# Patient Record
Sex: FEMALE | Race: WHITE
[De-identification: names, ages, dates, MRNs, and addresses within clinical notes are randomized per-mention and may not be internally consistent; named-entity substitution may affect disease eponyms.]

---

## 2020-01-22 ENCOUNTER — HOSPITAL ENCOUNTER (EMERGENCY)
Dept: HOSPITAL 8 - ED | Age: 75
Discharge: HOME | End: 2020-01-22
Payer: MEDICARE

## 2020-01-22 VITALS — BODY MASS INDEX: 29.86 KG/M2 | HEIGHT: 60 IN | WEIGHT: 152.12 LBS

## 2020-01-22 VITALS — DIASTOLIC BLOOD PRESSURE: 74 MMHG | SYSTOLIC BLOOD PRESSURE: 104 MMHG

## 2020-01-22 DIAGNOSIS — I10: ICD-10-CM

## 2020-01-22 DIAGNOSIS — Z00.00: Primary | ICD-10-CM

## 2020-01-22 DIAGNOSIS — Z91.14: ICD-10-CM

## 2020-01-22 PROCEDURE — 99283 EMERGENCY DEPT VISIT LOW MDM: CPT

## 2020-01-22 NOTE — NUR
ADDENDUM:  WHILST SPEAKING TO STAFF ON THE PHONE THIS RN REQUESTED THE SENDING 
PHYSICIAN COME TO THE HOSPITAL AND PLACE THE ORDERS HERSELF IF SHE REQUESTS 
THEY BE DONE WITHOUT HAVING ANY DOCUMENTATION SPECIFYING WHAT SHE WANTS.  THIS 
WAS DECLINED BY STAFF.  THIS RN ALSO ASKED WHY THIS TESTING COULDNT BE DONE AS 
AN OUTPATIENT AT AN OUTPATIENT LAB.  STAFF REPLIED STATING THEY ARE JUST 
FOLLOWING THE DOCTORS ORDERS.

## 2020-01-22 NOTE — NUR
PT AOX4.  DENIES COMPLAINTS.  DOES NOT WANT ANY TESTING DONE.  CALLED THE 
[facility] TO INQUIRE ABOUT DECISION TO SEND PT TO THE HOSPITAL.  SPOKE TO 
SONJA WHO TRANSFERRED TO ME TO [facility] MEJIA WHOM INFORMED ME THEY WERE 
ONLY FOLLOWING CORRINNE JEWELLS NP ORDER'S.  i ASKED ABOUT WHAT THEY BELIEVE IS 
WRONG AND THEY STATE THE PT IS REFUSING TO TAKE HER MEDICATION.  PT STATES THE 
STAFF THERE HASNT BEEN GIVING HER HER MEDICATIONS ON TIME SO SHE MISSED A 
COUPLE DOSES.  ABX BOTTLE WAS CHECKED AND THREE PILLS REMAIN ON A 7 DAY COURSE 
BID FILLED ONT HE 14TH.  STAFF WAS INFORMED OF THIS. THIS RN REQUESTED TO SPEAK 
TO CORINNE JEWELL NP WHICH THE STAFF REFUSED TO CALL AND REFUSED TO ALLOW THIS 
RN TO CALL.  THEY ALSO STATE THERE IS NO CURRENT MD OR NP AT THEIR FACILITY 
CURRENTLY.  MEJIA THEN CALLED NURSE KYE(?) ON ANOTHER PHONE AND VIA 
SPEAKER ON THE OTHER PHONE WE CONVERSED.  SHE ALSO DECLINED TO GIVE A 
LEGITIMATE REASON FOR THE TRANSPORT OTHER THAN MEDICATION NON COMPLIANCE.  I 
ASKED HER IF THIS RN COULD SPEAK TO THEIR PROVIDER AND SHE ALSO DECLINED.  THIS 
RN SPOKE TO THE PT ABOUT THIS AND SHE STATES SHE IS WILLING TO GO BACK AND BE 
COMPLIANT WITH HER ABX.

## 2020-01-22 NOTE — NUR
BIBA. REPORT RECEIVED FROM EMS. PT IS MORE CONFUSED AND RISK FOR FALL PER 
NURSING HOME STAFF. RECENT UTI AND ABX NOT COMPLETED(3DAYS LEFT?). PT DENIES 
ANY PHYSICAL SYMPTOMS. PT'S AOX4. RESPS EVEN AND UNLABORED. ALL MONITORS IN 
PLACE. CALL LIGHT WITHIN REACH. NSR RATE 90'S ON CARDIAC MONITOR AT THIS TIME.

## 2020-01-22 NOTE — NUR
WITH PTS PERMISSION THIS RN SPOKE WITH PTS DAUGHTER WHO IS ALSO PTS POA.  PT 
SIGNED RELEASE OF INFORMATION. POC DISCUSSED AND ALL ARE IN AGREEMENT.  PT TO 
BE DISCHARGED.

## 2020-01-22 NOTE — NUR
THIS RN AND ER MD CONVERSED ABOUT PTS CARE.  WE ARE IN AGREEMENT PT DOES NOT 
SHOW ANY SIGNS OF SEPSIS.  PT REMAINS AOX4.  VITALS SIGNS REMAIN STABLE.  THIS 
RN, ER MD, AND PT ARE IN AGREEMENT THAT ANY TESTING AT THIS TIME IS UNNECESSARY 
AND UNWARRANTED.

## 2020-11-07 ENCOUNTER — APPOINTMENT (OUTPATIENT)
Dept: RADIOLOGY | Facility: MEDICAL CENTER | Age: 75
End: 2020-11-07
Attending: EMERGENCY MEDICINE
Payer: MEDICARE

## 2020-11-07 ENCOUNTER — HOSPITAL ENCOUNTER (OUTPATIENT)
Facility: MEDICAL CENTER | Age: 75
End: 2020-11-17
Attending: EMERGENCY MEDICINE | Admitting: HOSPITALIST
Payer: MEDICARE
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
## 2020-11-07 DIAGNOSIS — S43.014A ANTERIOR DISLOCATION OF RIGHT SHOULDER, INITIAL ENCOUNTER: ICD-10-CM

## 2020-11-07 DIAGNOSIS — S43.014A ANTERIOR DISLOCATION OF SHOULDER, RIGHT, INITIAL ENCOUNTER: ICD-10-CM

## 2020-11-07 LAB
ALBUMIN SERPL BCP-MCNC: 4.4 G/DL (ref 3.2–4.9)
ALBUMIN/GLOB SERPL: 1.6 G/DL
ALP SERPL-CCNC: 136 U/L (ref 30–99)
ALT SERPL-CCNC: 18 U/L (ref 2–50)
ANION GAP SERPL CALC-SCNC: 13 MMOL/L (ref 7–16)
AST SERPL-CCNC: 30 U/L (ref 12–45)
BASOPHILS # BLD AUTO: 0.4 % (ref 0–1.8)
BASOPHILS # BLD: 0.06 K/UL (ref 0–0.12)
BILIRUB SERPL-MCNC: 0.4 MG/DL (ref 0.1–1.5)
BUN SERPL-MCNC: 15 MG/DL (ref 8–22)
CALCIUM SERPL-MCNC: 9.3 MG/DL (ref 8.5–10.5)
CHLORIDE SERPL-SCNC: 95 MMOL/L (ref 96–112)
CO2 SERPL-SCNC: 23 MMOL/L (ref 20–33)
COVID ORDER STATUS COVID19: NORMAL
CREAT SERPL-MCNC: 0.65 MG/DL (ref 0.5–1.4)
EOSINOPHIL # BLD AUTO: 0.02 K/UL (ref 0–0.51)
EOSINOPHIL NFR BLD: 0.1 % (ref 0–6.9)
ERYTHROCYTE [DISTWIDTH] IN BLOOD BY AUTOMATED COUNT: 46.5 FL (ref 35.9–50)
GLOBULIN SER CALC-MCNC: 2.7 G/DL (ref 1.9–3.5)
GLUCOSE SERPL-MCNC: 122 MG/DL (ref 65–99)
HCT VFR BLD AUTO: 38.7 % (ref 37–47)
HGB BLD-MCNC: 13.2 G/DL (ref 12–16)
IMM GRANULOCYTES # BLD AUTO: 0.08 K/UL (ref 0–0.11)
IMM GRANULOCYTES NFR BLD AUTO: 0.5 % (ref 0–0.9)
LYMPHOCYTES # BLD AUTO: 2.1 K/UL (ref 1–4.8)
LYMPHOCYTES NFR BLD: 13.2 % (ref 22–41)
MCH RBC QN AUTO: 32.5 PG (ref 27–33)
MCHC RBC AUTO-ENTMCNC: 34.1 G/DL (ref 33.6–35)
MCV RBC AUTO: 95.3 FL (ref 81.4–97.8)
MONOCYTES # BLD AUTO: 0.88 K/UL (ref 0–0.85)
MONOCYTES NFR BLD AUTO: 5.5 % (ref 0–13.4)
NEUTROPHILS # BLD AUTO: 12.76 K/UL (ref 2–7.15)
NEUTROPHILS NFR BLD: 80.3 % (ref 44–72)
NRBC # BLD AUTO: 0 K/UL
NRBC BLD-RTO: 0 /100 WBC
PLATELET # BLD AUTO: 370 K/UL (ref 164–446)
PMV BLD AUTO: 9.7 FL (ref 9–12.9)
POTASSIUM SERPL-SCNC: 3.9 MMOL/L (ref 3.6–5.5)
PROT SERPL-MCNC: 7.1 G/DL (ref 6–8.2)
RBC # BLD AUTO: 4.06 M/UL (ref 4.2–5.4)
SODIUM SERPL-SCNC: 131 MMOL/L (ref 135–145)
WBC # BLD AUTO: 15.9 K/UL (ref 4.8–10.8)

## 2020-11-07 PROCEDURE — 73060 X-RAY EXAM OF HUMERUS: CPT | Mod: RT

## 2020-11-07 PROCEDURE — G0378 HOSPITAL OBSERVATION PER HR: HCPCS

## 2020-11-07 PROCEDURE — 23650 CLTX SHO DSLC W/MNPJ WO ANES: CPT | Mod: XU

## 2020-11-07 PROCEDURE — 99285 EMERGENCY DEPT VISIT HI MDM: CPT

## 2020-11-07 PROCEDURE — 700111 HCHG RX REV CODE 636 W/ 250 OVERRIDE (IP): Performed by: EMERGENCY MEDICINE

## 2020-11-07 PROCEDURE — 80053 COMPREHEN METABOLIC PANEL: CPT

## 2020-11-07 PROCEDURE — 73030 X-RAY EXAM OF SHOULDER: CPT | Mod: RT

## 2020-11-07 PROCEDURE — U0003 INFECTIOUS AGENT DETECTION BY NUCLEIC ACID (DNA OR RNA); SEVERE ACUTE RESPIRATORY SYNDROME CORONAVIRUS 2 (SARS-COV-2) (CORONAVIRUS DISEASE [COVID-19]), AMPLIFIED PROBE TECHNIQUE, MAKING USE OF HIGH THROUGHPUT TECHNOLOGIES AS DESCRIBED BY CMS-2020-01-R: HCPCS

## 2020-11-07 PROCEDURE — 85025 COMPLETE CBC W/AUTO DIFF WBC: CPT

## 2020-11-07 PROCEDURE — 94770 HCHG CO2 EXPIRED GAS DETERMINATION: CPT

## 2020-11-07 PROCEDURE — 96374 THER/PROPH/DIAG INJ IV PUSH: CPT | Mod: XU

## 2020-11-07 PROCEDURE — 99219 PR INITIAL OBSERVATION CARE,LEVL II: CPT | Performed by: HOSPITALIST

## 2020-11-07 PROCEDURE — C9803 HOPD COVID-19 SPEC COLLECT: HCPCS | Performed by: INTERNAL MEDICINE

## 2020-11-07 RX ORDER — ONDANSETRON 2 MG/ML
4 INJECTION INTRAMUSCULAR; INTRAVENOUS EVERY 4 HOURS PRN
Status: DISCONTINUED | OUTPATIENT
Start: 2020-11-07 | End: 2020-11-17 | Stop reason: HOSPADM

## 2020-11-07 RX ORDER — BISACODYL 10 MG
10 SUPPOSITORY, RECTAL RECTAL
Status: DISCONTINUED | OUTPATIENT
Start: 2020-11-07 | End: 2020-11-17 | Stop reason: HOSPADM

## 2020-11-07 RX ORDER — BUPROPION HYDROCHLORIDE 100 MG/1
100 TABLET, EXTENDED RELEASE ORAL DAILY
COMMUNITY

## 2020-11-07 RX ORDER — MORPHINE SULFATE 4 MG/ML
2 INJECTION, SOLUTION INTRAMUSCULAR; INTRAVENOUS
Status: DISCONTINUED | OUTPATIENT
Start: 2020-11-07 | End: 2020-11-17 | Stop reason: HOSPADM

## 2020-11-07 RX ORDER — ONDANSETRON 4 MG/1
4 TABLET, ORALLY DISINTEGRATING ORAL EVERY 4 HOURS PRN
Status: DISCONTINUED | OUTPATIENT
Start: 2020-11-07 | End: 2020-11-17 | Stop reason: HOSPADM

## 2020-11-07 RX ORDER — DULOXETIN HYDROCHLORIDE 30 MG/1
60 CAPSULE, DELAYED RELEASE ORAL DAILY
Status: DISCONTINUED | OUTPATIENT
Start: 2020-11-08 | End: 2020-11-17 | Stop reason: HOSPADM

## 2020-11-07 RX ORDER — LABETALOL HYDROCHLORIDE 5 MG/ML
10 INJECTION, SOLUTION INTRAVENOUS EVERY 4 HOURS PRN
Status: DISCONTINUED | OUTPATIENT
Start: 2020-11-07 | End: 2020-11-17 | Stop reason: HOSPADM

## 2020-11-07 RX ORDER — ACETAMINOPHEN 325 MG/1
650 TABLET ORAL EVERY 6 HOURS PRN
Status: DISCONTINUED | OUTPATIENT
Start: 2020-11-07 | End: 2020-11-17 | Stop reason: HOSPADM

## 2020-11-07 RX ORDER — FLUOXETINE HYDROCHLORIDE 20 MG/1
20 CAPSULE ORAL EVERY MORNING
Status: DISCONTINUED | OUTPATIENT
Start: 2020-11-08 | End: 2020-11-07

## 2020-11-07 RX ORDER — POLYETHYLENE GLYCOL 3350 17 G/17G
1 POWDER, FOR SOLUTION ORAL
Status: DISCONTINUED | OUTPATIENT
Start: 2020-11-07 | End: 2020-11-17 | Stop reason: HOSPADM

## 2020-11-07 RX ORDER — BUPROPION HYDROCHLORIDE 100 MG/1
100 TABLET, EXTENDED RELEASE ORAL DAILY
Status: DISCONTINUED | OUTPATIENT
Start: 2020-11-08 | End: 2020-11-17 | Stop reason: HOSPADM

## 2020-11-07 RX ORDER — DULOXETIN HYDROCHLORIDE 60 MG/1
60 CAPSULE, DELAYED RELEASE ORAL DAILY
COMMUNITY

## 2020-11-07 RX ORDER — OXYCODONE HYDROCHLORIDE 5 MG/1
2.5 TABLET ORAL
Status: DISCONTINUED | OUTPATIENT
Start: 2020-11-07 | End: 2020-11-17 | Stop reason: HOSPADM

## 2020-11-07 RX ORDER — MULTIVIT WITH MINERALS/LUTEIN
1 TABLET ORAL DAILY
COMMUNITY

## 2020-11-07 RX ORDER — ENALAPRIL MALEATE 20 MG/1
20 TABLET ORAL EVERY MORNING
COMMUNITY

## 2020-11-07 RX ORDER — OXYCODONE HYDROCHLORIDE 5 MG/1
5 TABLET ORAL
Status: DISCONTINUED | OUTPATIENT
Start: 2020-11-07 | End: 2020-11-17 | Stop reason: HOSPADM

## 2020-11-07 RX ORDER — OXYBUTYNIN CHLORIDE 5 MG/1
5 TABLET ORAL
COMMUNITY

## 2020-11-07 RX ORDER — ENALAPRIL MALEATE 10 MG/1
20 TABLET ORAL EVERY MORNING
Status: DISCONTINUED | OUTPATIENT
Start: 2020-11-08 | End: 2020-11-17 | Stop reason: HOSPADM

## 2020-11-07 RX ORDER — AMOXICILLIN 250 MG
2 CAPSULE ORAL 2 TIMES DAILY
Status: DISCONTINUED | OUTPATIENT
Start: 2020-11-07 | End: 2020-11-17 | Stop reason: HOSPADM

## 2020-11-07 RX ORDER — FLUOXETINE HYDROCHLORIDE 20 MG/1
20 CAPSULE ORAL EVERY MORNING
COMMUNITY

## 2020-11-07 RX ADMIN — PROPOFOL 60 MG: 10 INJECTION, EMULSION INTRAVENOUS at 14:38

## 2020-11-07 ASSESSMENT — ENCOUNTER SYMPTOMS
ABDOMINAL PAIN: 0
NAUSEA: 0
LOSS OF CONSCIOUSNESS: 0
FEVER: 0
HEADACHES: 0
WEIGHT LOSS: 0
NERVOUS/ANXIOUS: 0
DIZZINESS: 0
SHORTNESS OF BREATH: 0
SORE THROAT: 0

## 2020-11-07 ASSESSMENT — LIFESTYLE VARIABLES
EVER HAD A DRINK FIRST THING IN THE MORNING TO STEADY YOUR NERVES TO GET RID OF A HANGOVER: NO
TOTAL SCORE: 0
DO YOU DRINK ALCOHOL: NO
ON A TYPICAL DAY WHEN YOU DRINK ALCOHOL HOW MANY DRINKS DO YOU HAVE: 0
EVER FELT BAD OR GUILTY ABOUT YOUR DRINKING: NO
TOTAL SCORE: 0
HAVE PEOPLE ANNOYED YOU BY CRITICIZING YOUR DRINKING: NO
HOW MANY TIMES IN THE PAST YEAR HAVE YOU HAD 5 OR MORE DRINKS IN A DAY: 0
AVERAGE NUMBER OF DAYS PER WEEK YOU HAVE A DRINK CONTAINING ALCOHOL: 0
CONSUMPTION TOTAL: NEGATIVE
TOTAL SCORE: 0
HAVE YOU EVER FELT YOU SHOULD CUT DOWN ON YOUR DRINKING: NO

## 2020-11-07 ASSESSMENT — PATIENT HEALTH QUESTIONNAIRE - PHQ9
SUM OF ALL RESPONSES TO PHQ9 QUESTIONS 1 AND 2: 0
2. FEELING DOWN, DEPRESSED, IRRITABLE, OR HOPELESS: NOT AT ALL
1. LITTLE INTEREST OR PLEASURE IN DOING THINGS: NOT AT ALL

## 2020-11-07 ASSESSMENT — FIBROSIS 4 INDEX: FIB4 SCORE: 1.47

## 2020-11-07 NOTE — ED TRIAGE NOTES
.Hannah Singh  .  Chief Complaint   Patient presents with   • Shoulder Pain   • GLF     Patient FREDRICK FLORES from [place] c/o right shoulder pain s/p fall. + CMS. aao x 4.     ERP to see.     Report given to YOHAN Malcolm.

## 2020-11-07 NOTE — ED PROVIDER NOTES
ED Provider Note    CHIEF COMPLAINT  Chief Complaint   Patient presents with   • Shoulder Pain   • GLF       HPI  Hannah Singh is a 75 y.o. female who presents for evaluation of a right shoulder injury after a ground-level fall.  She resides at [place], states that she tripped and fell and only injured her right shoulder.  Denies a headache or neck pain or back pain, no chest pain or abdominal pain.  She has no current weakness or numbness but states that when this first happened she had tingling in her right hand now resolved.  History of hypertension.  Denies blood thinners.  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    REVIEW OF SYSTEMS  Negative for headache, neck pain, chest pain, back pain, abdominal pain, vomiting, headache, all other systems are negative.    PAST MEDICAL HISTORY  Past Medical History:   Diagnosis Date   • Hypertension        FAMILY HISTORY  History reviewed. No pertinent family history.    SOCIAL HISTORY  Social History     Tobacco Use   • Smoking status: Never Smoker   • Smokeless tobacco: Never Used   Substance Use Topics   • Alcohol use: Not Currently   • Drug use: Never       SURGICAL HISTORY  History reviewed. No pertinent surgical history.    CURRENT MEDICATIONS  I personally reviewed the medication list in the charting documentation.     ALLERGIES  No Known Allergies    MEDICAL RECORD  I have reviewed patient's medical record and pertinent results are listed above.      PHYSICAL EXAM  VITAL SIGNS: BP (!) 182/90   Pulse 85   Temp 36.1 °C (97 °F) (Temporal)   Resp 16   Wt 72.6 kg (160 lb)   SpO2 95%    Constitutional: Elderly, frail appearing, laying on her left side holding her right arm still.    HENT: Normocephalic, no evidence of acute trauma.  Eyes: No scleral icterus. Normal conjunctiva   Neck: No tenderness  Thorax & Lungs: Normal  nonlabored respirations  Abdomen: Soft, with no tenderness  Skin: Warm, dry. No rash appreciated  Extremities/Musculoskeletal: Difficult examination given her position and lack of cooperation.  Normal radial pulse and sensation involving the right arm distally.  Hard to assess for deformity of the right shoulder.  Neurologic: Alert & oriented. No focal deficits observed.     DIAGNOSTIC STUDIES / PROCEDURES    RADIOLOGY  DX-SHOULDER 2+ RIGHT   Final Result         Interval reduction of the right shoulder dislocation. No definite fracture is seen.      DX-HUMERUS 2+ RIGHT   Final Result            Anterior inferior right shoulder dislocation.      DX-SHOULDER 2+ RIGHT   Final Result         Anterior inferior right shoulder dislocation.            Joint Reduction Procedure Note    Indication: Right shoulder dislocation    Consent: The patient was counseled regarding the procedure, it's indications, risks, potential complications and alternatives and any questions were answered. Consent was obtained.    Procedure: The pre-reduction exam showed distal perfusion & neurologic function to be normal. The patient was placed in the appropriate position. Anesthesia/pain control was offered but the patient refused. Reduction of the right shoulder was performed by traction and counter traction. Post reduction films were obtained and revealed satisfactory reduction. A post-reduction exam revealed distal perfusion & neurologic function to be normal. The affected area was immobilized with a shoulder immobilizer.    The patient tolerated the procedure well.    Complications: None          Conscious Sedation Procedure Note    Indication: shoulder dislocation    Consent: I have discussed with the patient and/or the patient representative the indication, alternatives, and the possible risks and/or complications of the planned procedure and the anesthesia methods. The patient and/or patient representative appear to understand and agree  to proceed.    Physician Involvement: The attending physician was present and supervising this procedure.    Pre-Sedation Documentation and Exam: I have personally completed a history, physical exam & review of systems for this patient (see notes).  Airway Assessment: Mallampati Class II - (soft palate, fauces & uvula are visible)  f3  Prior History of Anesthesia Complications: none    ASA Classification: Class 3 - A patient with severe systemic disease that limits activity but is not incapacitating    Sedation/ Anesthesia Plan: intravenous sedation    Medications Used: propofol intravenously    Monitoring and Safety: The patient was placed on a cardiac monitor and vital signs, pulse oximetry and level of consciousness were continuously evaluated throughout the procedure. The patient was closely monitored until recovery from the medications was complete and the patient had returned to baseline status. Respiratory therapy was on standby at all times during the procedure.      (The following sections must be completed)  Post-Sedation Vital Signs: Vital signs were reviewed and were stable after the procedure (see flow sheet for vitals)            Intraservice Time: Greater than 10 minutes    Post-Sedation Exam: Lungs: clear and Cardiovascular: normal           Complications: none    I provided both the sedation and procedure, a nurse was present at the bedside for the entire procedure.       COURSE & MEDICAL DECISION MAKING  I have reviewed any medical record information, laboratory studies and radiographic results as noted above.    Encounter Summary: This is a 75 y.o. female with right shoulder injury after ground-level fall, denies any other injuries, has no other complaints, no other traumatic findings on exam.  This history and physical is very limited because she is not very cooperative, she only wants an x-ray and no other treatment or evaluation.  At this point an x-ray will be obtained, differential includes  shoulder dislocation versus humerus fracture ------- x-ray reveals a shoulder dislocation.  She was sedated and this was successfully reduced here in the emergency department.  She was placed in a shoulder immobilizer.  Unfortunately, the patient does not feel comfortable going home as she lives in an independent living facility and uses a walker, she states she is not safe to use a cane, case management has been consulted, they evaluate the patient, spoke to family members and unfortunately have not been able to find a safe solution so they recommend admission the hospital for ultimate placement elsewhere.      DISPOSITION: Admit in guarded condition      FINAL IMPRESSION  1. Anterior dislocation of right shoulder, initial encounter    2. Fall from ground level           This dictation was created using voice recognition software. The accuracy of the dictation is limited to the abilities of the software. I expect there may be some errors of grammar and possibly content. The nursing notes were reviewed and certain aspects of this information were incorporated into this note.    Electronically signed by: Rashi Patterson M.D., 11/7/2020 12:51 PM

## 2020-11-07 NOTE — RESPIRATORY CARE
Conscious Sedation Respiratory Update               Events/Summary/Plan: present for conscious sedation. no complications (11/07/20 4960)

## 2020-11-07 NOTE — ED NOTES
Pt to be mildly sedated for R shoulder reduction, RT, Dr. Patterson, and this RN at bedside, VSS on RA, timeout performed, pt giving verbal consent.

## 2020-11-07 NOTE — ED NOTES
Pt resting in bed, agitated demeanor, refusing IV and pain meds, VSS on RA, RUE in EMS sling, will continue to monitor.

## 2020-11-08 PROBLEM — D72.829 LEUKOCYTOSIS: Status: ACTIVE | Noted: 2020-11-08

## 2020-11-08 PROBLEM — E87.1 HYPONATREMIA: Status: ACTIVE | Noted: 2020-11-08

## 2020-11-08 LAB
SARS-COV-2 RNA RESP QL NAA+PROBE: NOTDETECTED
SPECIMEN SOURCE: NORMAL

## 2020-11-08 PROCEDURE — 700111 HCHG RX REV CODE 636 W/ 250 OVERRIDE (IP): Performed by: HOSPITALIST

## 2020-11-08 PROCEDURE — 97162 PT EVAL MOD COMPLEX 30 MIN: CPT

## 2020-11-08 PROCEDURE — 96372 THER/PROPH/DIAG INJ SC/IM: CPT

## 2020-11-08 PROCEDURE — 99225 PR SUBSEQUENT OBSERVATION CARE,LEVEL II: CPT | Performed by: INTERNAL MEDICINE

## 2020-11-08 PROCEDURE — 700102 HCHG RX REV CODE 250 W/ 637 OVERRIDE(OP): Performed by: HOSPITALIST

## 2020-11-08 PROCEDURE — A9270 NON-COVERED ITEM OR SERVICE: HCPCS | Performed by: HOSPITALIST

## 2020-11-08 PROCEDURE — G0378 HOSPITAL OBSERVATION PER HR: HCPCS

## 2020-11-08 RX ADMIN — BUPROPION HYDROCHLORIDE 100 MG: 100 TABLET, FILM COATED, EXTENDED RELEASE ORAL at 05:33

## 2020-11-08 RX ADMIN — ENOXAPARIN SODIUM 40 MG: 40 INJECTION SUBCUTANEOUS at 05:30

## 2020-11-08 RX ADMIN — DOCUSATE SODIUM 50 MG AND SENNOSIDES 8.6 MG 2 TABLET: 8.6; 5 TABLET, FILM COATED ORAL at 05:29

## 2020-11-08 RX ADMIN — ENALAPRIL MALEATE 20 MG: 10 TABLET ORAL at 05:33

## 2020-11-08 RX ADMIN — DULOXETINE HYDROCHLORIDE 60 MG: 30 CAPSULE, DELAYED RELEASE ORAL at 05:29

## 2020-11-08 ASSESSMENT — COGNITIVE AND FUNCTIONAL STATUS - GENERAL
MOVING FROM LYING ON BACK TO SITTING ON SIDE OF FLAT BED: UNABLE
MOVING TO AND FROM BED TO CHAIR: UNABLE
CLIMB 3 TO 5 STEPS WITH RAILING: A LOT
STANDING UP FROM CHAIR USING ARMS: A LOT
CLIMB 3 TO 5 STEPS WITH RAILING: TOTAL
SUGGESTED CMS G CODE MODIFIER MOBILITY: CM
DRESSING REGULAR LOWER BODY CLOTHING: A LITTLE
MOVING TO AND FROM BED TO CHAIR: A LITTLE
HELP NEEDED FOR BATHING: A LITTLE
WALKING IN HOSPITAL ROOM: TOTAL
DAILY ACTIVITIY SCORE: 19
MOBILITY SCORE: 17
PERSONAL GROOMING: A LITTLE
MOBILITY SCORE: 9
SUGGESTED CMS G CODE MODIFIER DAILY ACTIVITY: CK
TURNING FROM BACK TO SIDE WHILE IN FLAT BAD: A LITTLE
TURNING FROM BACK TO SIDE WHILE IN FLAT BAD: A LITTLE
SUGGESTED CMS G CODE MODIFIER MOBILITY: CK
TOILETING: A LITTLE
DRESSING REGULAR UPPER BODY CLOTHING: A LITTLE
MOVING FROM LYING ON BACK TO SITTING ON SIDE OF FLAT BED: A LITTLE
WALKING IN HOSPITAL ROOM: A LITTLE
STANDING UP FROM CHAIR USING ARMS: A LITTLE

## 2020-11-08 ASSESSMENT — ENCOUNTER SYMPTOMS
FOCAL WEAKNESS: 0
MYALGIAS: 1
FEVER: 0
DIZZINESS: 0
BACK PAIN: 0
SHORTNESS OF BREATH: 0
MEMORY LOSS: 0
CHILLS: 0
HEADACHES: 0
COUGH: 0
VOMITING: 0
PALPITATIONS: 0
SENSORY CHANGE: 0
SPEECH CHANGE: 0
FLANK PAIN: 0
DEPRESSION: 0
ABDOMINAL PAIN: 0
NAUSEA: 0
NERVOUS/ANXIOUS: 0
DIAPHORESIS: 0
WEAKNESS: 1
FALLS: 1

## 2020-11-08 ASSESSMENT — GAIT ASSESSMENTS: GAIT LEVEL OF ASSIST: UNABLE TO PARTICIPATE

## 2020-11-08 NOTE — PROGRESS NOTES
Bedside report received.  Assessment complete.  A&O x 4. Patient calls appropriately.  Patient ambulates with x1-2 assist.    Patient has 0/10 pain. Pain managed with prescribed medications.  Denies N&V. Tolerating regular diet.  Right upper extremity in immobilizer sling.   + void, + flatus, - BM.  Patient denies SOB.  SCD's off.    Review plan with of care with patient. Call light and personal belongings with in reach. Hourly rounding in place. All needs met at this time.

## 2020-11-08 NOTE — PROGRESS NOTES
[facility] Medicine Daily Progress Note    Date of Service  11/8/2020    Chief Complaint  75 y.o. female admitted 11/7/2020 with shoulder dislocation.    Hospital Course    This is a 75 y.o. female history of hypertension, bladder incontinence who presented 11/7/2020 with a fall today while bending over to try and clean up a spilled soda and ended up falling onto her right shoulder.  She did not lose consciousness.  Subsequently she had pain and is found to have an anterior dislocated shoulder.  Patient was given sedation in the emergency room and had reduction back in the place.  The patient normally uses a cane or walker with her right hand secondary to her shoulder being in a sling temporarily she is unable to handle her current walker or cane.  Her assisted living facility is unable to fully assist at this point in time recommended further evaluation before discharge to their facility.  Patient will be seen by PT/OT and consideration of skilled nursing if needed.  Currently the patient is quite verbally robust and pleasant.  On evaluation the patient notes that she had had some scabbing and bruising on her knees she states she had slid down a ramp at her house recently.  She states she otherwise has not been falling before that.    Interval Problem Update    Patient reports no pain while keeping arm still, does report minimal tenderness at shoulder joint  Pending PT/OT evaluation    Consultants/Specialty  None    Code Status  Full Code    Disposition  Lives at assisted living facility, for PT/OT evaluation, may need skilled nursing facility placement    Review of Systems  Review of Systems   Constitutional: Negative for chills, diaphoresis, fever and malaise/fatigue.   HENT: Negative for congestion and hearing loss.    Respiratory: Negative for cough and shortness of breath.    Cardiovascular: Negative for chest pain, palpitations and leg swelling.   Gastrointestinal: Negative for abdominal pain, nausea and  vomiting.   Genitourinary: Negative for dysuria and flank pain.   Musculoskeletal: Positive for falls, joint pain and myalgias. Negative for back pain.   Neurological: Positive for weakness. Negative for dizziness, sensory change, speech change, focal weakness and headaches.   Psychiatric/Behavioral: Negative for depression and memory loss. The patient is not nervous/anxious.         Physical Exam  Temp:  [36.4 °C (97.5 °F)-37.7 °C (99.8 °F)] 37.7 °C (99.8 °F)  Pulse:  [] 72  Resp:  [16] 16  BP: (124-145)/() 124/73  SpO2:  [92 %-99 %] 92 %    Physical Exam  Vitals signs and nursing note reviewed.   Constitutional:       General: She is not in acute distress.     Appearance: She is not ill-appearing or diaphoretic.   HENT:      Head: Normocephalic and atraumatic.      Nose: Nose normal.   Eyes:      General:         Right eye: No discharge.         Left eye: No discharge.      Pupils: Pupils are equal, round, and reactive to light.   Neck:      Musculoskeletal: Neck supple.      Thyroid: No thyromegaly.      Vascular: No JVD.   Cardiovascular:      Rate and Rhythm: Normal rate.      Heart sounds: No murmur.   Pulmonary:      Effort: No respiratory distress.      Breath sounds: Normal breath sounds. No wheezing.   Abdominal:      General: Bowel sounds are normal. There is no distension.      Palpations: Abdomen is soft.      Tenderness: There is no abdominal tenderness.   Musculoskeletal:         General: Swelling and tenderness present.      Comments: Right shoulder sling in place, minimal ecchymosis and edema, nontender to palpation   Skin:     General: Skin is warm and dry.      Coloration: Skin is not pale.      Findings: No erythema or rash.   Neurological:      Mental Status: She is alert and oriented to person, place, and time.      Cranial Nerves: No cranial nerve deficit.      Sensory: No sensory deficit.      Motor: Weakness present.      Coordination: Coordination normal.   Psychiatric:          Behavior: Behavior normal.         Thought Content: Thought content normal.         Fluids    Intake/Output Summary (Last 24 hours) at 11/8/2020 1329  Last data filed at 11/8/2020 0905  Gross per 24 hour   Intake 6 ml   Output 950 ml   Net -944 ml       Laboratory  Recent Labs     11/07/20  1859   WBC 15.9*   RBC 4.06*   HEMOGLOBIN 13.2   HEMATOCRIT 38.7   MCV 95.3   MCH 32.5   MCHC 34.1   RDW 46.5   PLATELETCT 370   MPV 9.7     Recent Labs     11/07/20  1859   SODIUM 131*   POTASSIUM 3.9   CHLORIDE 95*   CO2 23   GLUCOSE 122*   BUN 15   CREATININE 0.65   CALCIUM 9.3                   Imaging  DX-SHOULDER 2+ RIGHT   Final Result         Interval reduction of the right shoulder dislocation. No definite fracture is seen.      DX-HUMERUS 2+ RIGHT   Final Result            Anterior inferior right shoulder dislocation.      DX-SHOULDER 2+ RIGHT   Final Result         Anterior inferior right shoulder dislocation.           Assessment/Plan  * Anterior dislocation of shoulder, right, initial encounter  Assessment & Plan  Status post replacement in the emergency room with sedation.  Pain management  Patient normally uses cane and walker.  Currently due to her arm being in a sling she cannot use this.  The assisted living facility felt they could not take her back at this point time till she has a safe discharge.      11/8 PT/OT evaluate patient.  May need new type of walker to aid her.  Patient may require skilled nursing placement if unable to get back to her prior assisted living facility.  No pain with assistance  S/p mechanical fall      Leukocytosis  Assessment & Plan  No complaints, monitor  F/u am labs      Hyponatremia  Assessment & Plan  Mild, monitor  F/u am labs    Hypertension  Assessment & Plan  Continue enalapril with parameters  Monitor vitals       VTE prophylaxis: Lovenox

## 2020-11-08 NOTE — ED NOTES
Pt argumentative at attempted discharge, states she does not have a way home as she doesn't think she can get into her daughter's car, spoke with daughter on phone who stated she would be able to give pt ride home and could have family present to assist.  at bedside coordinating pt's departure/transportation.

## 2020-11-08 NOTE — H&P
Hospital Medicine History & Physical Note    Date of Service  11/7/2020    Primary Care Physician  Danisha Hernandez M.D.    Consultants  None    Code Status  Full Code    Chief Complaint  Chief Complaint   Patient presents with   • Shoulder Pain   • GLF       History of Presenting Illness  This is a 75 y.o. female history of hypertension, bladder incontinence who presented 11/7/2020 with a fall today while bending over to try and clean up a spilled soda and ended up falling onto her right shoulder.  She did not lose consciousness.  Subsequently she had pain and is found to have an anterior dislocated shoulder.  Patient was given sedation in the emergency room and had reduction back in the place.  The patient normally uses a cane or walker with her right hand secondary to her shoulder being in a sling temporarily she is unable to handle her current walker or cane.  Her assisted living facility is unable to fully assist at this point in time recommended further evaluation before discharge to their facility.  Patient will be seen by PT/OT and consideration of skilled nursing if needed.  Currently the patient is quite verbally robust and pleasant.  On evaluation the patient notes that she had had some scabbing and bruising on her knees she states she had slid down a ramp at her house recently.  She states she otherwise has not been falling before that.    Review of Systems  Review of Systems   Constitutional: Negative for fever and weight loss.   HENT: Negative for sore throat.    Respiratory: Negative for shortness of breath.    Cardiovascular: Negative for chest pain.   Gastrointestinal: Negative for abdominal pain and nausea.   Musculoskeletal: Positive for joint pain.   Neurological: Negative for dizziness, loss of consciousness and headaches.   Psychiatric/Behavioral: The patient is not nervous/anxious.        Past Medical History   has a past medical history of Hypertension.    Surgical History  Left total  knee [facility]    Family History  Mother  of oral cancer in her 90s  And her brother  of a heroin overdose  Another brother that  from suspected cirrhosis of liver from alcohol    Social History   reports that she has never smoked. She has never used smokeless tobacco. She reports previous alcohol use. She reports that she does not use drugs.  She is proud to state she never inhaled while going to her [employer] concert in [place].  She lives at an assisted living facility.  Normally uses a cane or walker.  She is a former nurse.    Allergies  No Known Allergies    Medications  Prior to Admission Medications   Prescriptions Last Dose Informant Patient Reported? Taking?   FLUoxetine (PROZAC) 20 MG Cap 2020 at 1030 Patient Yes Yes   Sig: Take 20 mg by mouth every morning.   Multiple Vitamins-Minerals (CENTRUM SILVER) Tab 2020 at 1200 Patient Yes Yes   Sig: Take 1 Tab by mouth every day. Takes in morning or with lunch.   buPROPion HCl (WELLBUTRIN PO) 2020 at 1030 Patient Yes Yes   Sig: Take 1 tablet by mouth every morning. Unknown strength.   enalapril (VASOTEC) 20 MG tablet 2020 at 1030 Patient Yes Yes   Sig: Take 20 mg by mouth every morning.      Facility-Administered Medications: None       Physical Exam  Temp:  [36.1 °C (97 °F)] 36.1 °C (97 °F)  Pulse:  [85-99] 90  Resp:  [16] 16  BP: (134-182)/(73-90) 134/73  SpO2:  [93 %-99 %] 93 %    Physical Exam  Vitals signs reviewed.   Constitutional:       Appearance: Normal appearance. She is obese.   HENT:      Head: Normocephalic and atraumatic.      Nose: Nose normal.      Mouth/Throat:      Mouth: Mucous membranes are dry.      Pharynx: No oropharyngeal exudate.   Eyes:      General:         Right eye: No discharge.      Extraocular Movements: Extraocular movements intact.      Conjunctiva/sclera: Conjunctivae normal.   Cardiovascular:      Rate and Rhythm: Normal rate and regular rhythm.      Pulses: Normal pulses.       Heart sounds: Normal heart sounds.   Pulmonary:      Effort: No respiratory distress.      Breath sounds: Normal breath sounds. No wheezing.   Abdominal:      General: Bowel sounds are normal. There is no distension.      Palpations: Abdomen is soft.      Tenderness: There is no abdominal tenderness.   Musculoskeletal:      Right lower leg: No edema.      Left lower leg: No edema.   Lymphadenopathy:      Cervical: No cervical adenopathy.   Skin:     Comments: Scab to the left anterior knee with some light abrasions to the right knee   Neurological:      General: No focal deficit present.      Mental Status: She is alert and oriented to person, place, and time.   Psychiatric:         Mood and Affect: Mood normal.         Behavior: Behavior normal.         Thought Content: Thought content normal.         Laboratory:          No results for input(s): ALTSGPT, ASTSGOT, ALKPHOSPHAT, TBILIRUBIN, DBILIRUBIN, GAMMAGT, AMYLASE, LIPASE, ALB, PREALBUMIN, GLUCOSE in the last 72 hours.      No results for input(s): NTPROBNP in the last 72 hours.      No results for input(s): TROPONINT in the last 72 hours.    Imaging:  DX-SHOULDER 2+ RIGHT   Final Result         Interval reduction of the right shoulder dislocation. No definite fracture is seen.      DX-HUMERUS 2+ RIGHT   Final Result            Anterior inferior right shoulder dislocation.      DX-SHOULDER 2+ RIGHT   Final Result         Anterior inferior right shoulder dislocation.            Assessment/Plan:  I anticipate this patient is appropriate for observation status at this time.    * Anterior dislocation of shoulder, right, initial encounter  Assessment & Plan  Status post replacement in the emergency room with sedation.  Pain management  Patient normally uses cane and walker.  Currently due to her arm being in a sling she cannot use this.  The assisted living facility felt they could not take her back at this point time till she has a safe discharge.  We will need to  have PT/OT evaluate patient.  May need new type of walker to aid her.  Patient may require skilled nursing placement if unable to get back to her prior assisted living facility.      Hypertension  Assessment & Plan  Continue enalapril with parameters  Monitor vitals

## 2020-11-08 NOTE — DISCHARGE PLANNING
CM met with pt and spoke with daughter (Leonila [phone]) via phone. She states she is DPOA. Pt generally goes to [facility] but was diverted here.     Leonila reports that her mother lives in [place] independent living currently but has had multiple falls recently. She is concerned with her having on the immobilizer (needs for a few weeks per ERP) and being able to use her walker. [place] has had Covid cases and meals are just brought to the door and the patient is expected to get meal tray and eat in room. Leonila does not feel she can do this, nor do her own ADLs. She is unable to take her to her house to stay, even for short term.    Unable to check at this time if she can go to Assisted Living at [place] and unlikely will not find out until Monday. Other options would be hiring a caregiver but unknown if [facility] would allow in. SNF possible depending on PT eval.    Per Leonila pt has a pension of $5600/month and pays around $3800 to live at [place].

## 2020-11-08 NOTE — PROGRESS NOTES
Pt arrived w/belongings via gurney in stable condition, call light in reach, POC reviewed, R shoulder immobilizer in place

## 2020-11-08 NOTE — ASSESSMENT & PLAN NOTE
Status post replacement in the emergency room with sedation.  Pain management  Patient normally uses cane and walker.  Currently due to her arm being in a sling she cannot use this.  The assisted living facility felt they could not take her back at this point time till she has a safe discharge.      11/8 PT/OT evaluate patient.  May need new type of walker to aid her.  Patient may require skilled nursing placement if unable to get back to her prior assisted living facility.  No pain with assistance  S/p mechanical fall    11/9 PT/OT evaluation, shoulder sling  Follow-up a.m. labs leukocytosis noted  We will follow-up with Ortho regarding weightbearing status recommendations  PT recommending skilled nursing referral, referral placed  However patient adamantly refusing transfer, will continue therapy    11/10 orthopedic consult per patient request, states that she follows up closely with [facility]  Patient seen by OT, requiring max assistance  Still adamantly refusing skilled nursing referral  Continue inpatient therapy  Assisted living facility will need to meet patient's needs prior to discharge  hospitalist RN to follow-up regarding patient concerns    11/11  As per ortho, R axillary shoulder xray  - nwb to RUE x 2 weeks  - outpatient f/u with surgery in 2 weeks  - continuous use of shoulder sling  snf referral    11/12 encourage activity  Rehab referral,  to assist  Patient now agreeable    11/13-15 medically cleared for discharge to skilled nursing or inpatient rehab.  Would benefit from physical therapy.

## 2020-11-08 NOTE — ED NOTES
Med rec PARTIALLY completed per Pt at bedside. Patient unable to recall strength of Wellbutrin that she is taking. Contacted daughter via phone to attempt to obtain clarification. Currently awaiting response.  Daughter's phone number [phone]  Allergies reviewed with Pt.  No ABX in last 14 days.

## 2020-11-08 NOTE — CARE PLAN
Problem: Communication  Goal: The ability to communicate needs accurately and effectively will improve  Outcome: PROGRESSING AS EXPECTED  Note: POC reviewed w/pt; pt verbalizes understanding     Problem: Venous Thromboembolism (VTW)/Deep Vein Thrombosis (DVT) Prevention:  Goal: Patient will participate in Venous Thrombosis (VTE)/Deep Vein Thrombosis (DVT)Prevention Measures  Outcome: PROGRESSING AS EXPECTED  Flowsheets (Taken 11/8/2020 0211)  Pharmacologic Prophylaxis Used: LMWH: Enoxaparin(Lovenox)  Note: Lovenox ordered, to start 0600 11/8

## 2020-11-08 NOTE — PROGRESS NOTES
2 RN Skin check:    Scabbing BLE and feet  Bilateral foot bunions, medial near greater toes  Bilat great toes slanted laterally    20g PIV LFA SL  Purewick in place for stress incontinence  R shoulder immobilizer in place    All other areas and bony prominences CDI

## 2020-11-08 NOTE — PROGRESS NOTES
[facility] TRI[facility]IC[facility] ER REPORT  Consult/Admission requested by: Dr Vázquez  Chief complaint: This is a 75 y.o. female with right shoulder injury after ground-level fall,   Pertinent history/ER Course: She was found to have dislocated shoulder, which was relocated in the emergency department.  Now she could not be discharged safely back to her independent living facility, because she could not use her walker which she normally uses.   unable to assist with safe discharge home.  She will need to stay until safe discharge option found.  Code Status: full per ERP, I personally verified with the ERP patient's code status and the ERP has confirmed this with the patient.   Patient meets admission criterion: Yes..  Recommendations given or work up & consultations requested per triage officer:   Consultants involved and pertinent input from consultants:   Admission status: Observation.   Admission order placed: Yes.   Floor requested: med  [facility] hospitalist: Dr Ambrocio

## 2020-11-08 NOTE — ED NOTES
Pt resting in bed, VSS on RA, GCS 15, shoulder immobilizer in place, pt has no complaints, will continue to monitor.

## 2020-11-09 PROCEDURE — G0378 HOSPITAL OBSERVATION PER HR: HCPCS

## 2020-11-09 PROCEDURE — 99225 PR SUBSEQUENT OBSERVATION CARE,LEVEL II: CPT | Performed by: INTERNAL MEDICINE

## 2020-11-09 PROCEDURE — 97165 OT EVAL LOW COMPLEX 30 MIN: CPT

## 2020-11-09 PROCEDURE — 96372 THER/PROPH/DIAG INJ SC/IM: CPT

## 2020-11-09 PROCEDURE — 700111 HCHG RX REV CODE 636 W/ 250 OVERRIDE (IP): Performed by: HOSPITALIST

## 2020-11-09 PROCEDURE — 700102 HCHG RX REV CODE 250 W/ 637 OVERRIDE(OP): Performed by: HOSPITALIST

## 2020-11-09 PROCEDURE — A9270 NON-COVERED ITEM OR SERVICE: HCPCS | Performed by: HOSPITALIST

## 2020-11-09 RX ADMIN — ENOXAPARIN SODIUM 40 MG: 40 INJECTION SUBCUTANEOUS at 06:47

## 2020-11-09 RX ADMIN — ENALAPRIL MALEATE 20 MG: 10 TABLET ORAL at 06:47

## 2020-11-09 RX ADMIN — DOCUSATE SODIUM 50 MG AND SENNOSIDES 8.6 MG 2 TABLET: 8.6; 5 TABLET, FILM COATED ORAL at 06:47

## 2020-11-09 RX ADMIN — DULOXETINE HYDROCHLORIDE 60 MG: 30 CAPSULE, DELAYED RELEASE ORAL at 09:41

## 2020-11-09 RX ADMIN — BUPROPION HYDROCHLORIDE 100 MG: 100 TABLET, FILM COATED, EXTENDED RELEASE ORAL at 06:47

## 2020-11-09 ASSESSMENT — ENCOUNTER SYMPTOMS
FALLS: 1
NERVOUS/ANXIOUS: 0
FEVER: 0
DIZZINESS: 0
SHORTNESS OF BREATH: 0
MYALGIAS: 1
PALPITATIONS: 0
NAUSEA: 0
ABDOMINAL PAIN: 0
BACK PAIN: 0
MEMORY LOSS: 0
WEAKNESS: 1
FLANK PAIN: 0
CHILLS: 0
HEADACHES: 0
FOCAL WEAKNESS: 0

## 2020-11-09 ASSESSMENT — COGNITIVE AND FUNCTIONAL STATUS - GENERAL
PERSONAL GROOMING: A LITTLE
HELP NEEDED FOR BATHING: A LOT
DRESSING REGULAR LOWER BODY CLOTHING: A LOT
DRESSING REGULAR UPPER BODY CLOTHING: A LITTLE
SUGGESTED CMS G CODE MODIFIER DAILY ACTIVITY: CK
EATING MEALS: A LITTLE
TOILETING: A LOT
DAILY ACTIVITIY SCORE: 15

## 2020-11-09 ASSESSMENT — ACTIVITIES OF DAILY LIVING (ADL): TOILETING: INDEPENDENT

## 2020-11-09 NOTE — CARE PLAN
Problem: Communication  Goal: The ability to communicate needs accurately and effectively will improve  Outcome: PROGRESSING AS EXPECTED  Note: Review plan of care with patient , encourage patient to ask questions and voice concerns      Problem: Safety  Goal: Will remain free from falls  11/8/2020 1656 by Amita Mahmood R.N.  Outcome: PROGRESSING AS EXPECTED  11/8/2020 1655 by Amita Mahmood R.N.  Note: Bed locked and in lowest position, side rails up x 2, call light within reach, patient educated to call for assistance

## 2020-11-09 NOTE — PROGRESS NOTES
[facility] Medicine Daily Progress Note    Date of Service  11/9/2020    Chief Complaint  75 y.o. female admitted 11/7/2020 with shoulder dislocation.    Hospital Course    This is a 75 y.o. female history of hypertension, bladder incontinence who presented 11/7/2020 with a fall today while bending over to try and clean up a spilled soda and ended up falling onto her right shoulder.  She did not lose consciousness.  Subsequently she had pain and is found to have an anterior dislocated shoulder.  Patient was given sedation in the emergency room and had reduction back in the place.  The patient normally uses a cane or walker with her right hand secondary to her shoulder being in a sling temporarily she is unable to handle her current walker or cane.  Her assisted living facility is unable to fully assist at this point in time recommended further evaluation before discharge to their facility.  Patient will be seen by PT/OT and consideration of skilled nursing if needed.  Currently the patient is quite verbally robust and pleasant.  On evaluation the patient notes that she had had some scabbing and bruising on her knees she states she had slid down a ramp at her house recently.  She states she otherwise has not been falling before that.    Interval Problem Update  Patient reports pain controlled with immobilizer in place  Adamantly refusing skilled nursing facility placement    Discussed with orthopedics, recommendation for weightbearing as tolerated with shoulder immobilizer in place, but will need outpatient follow-up with orthopedic surgery and 2 weeks for further recommendations and range of motion exercises    Seen by PT, recommending skilled placement, however patient refusing  We will continue therapy, OT to follow-up  We will need to demonstrate appropriate activity tolerance prior to discharge home with home health in assisted living      Consultants/Specialty  None    Code Status  Full Code    Disposition  Lives  at assisted living facility  Continue therapy  Patient requesting discharge with home health, refusing skilled nursing placement    Review of Systems  Review of Systems   Constitutional: Negative for chills, fever and malaise/fatigue.   HENT: Negative for congestion.    Respiratory: Negative for shortness of breath.    Cardiovascular: Negative for chest pain, palpitations and leg swelling.   Gastrointestinal: Negative for abdominal pain and nausea.   Genitourinary: Negative for dysuria and flank pain.   Musculoskeletal: Positive for falls, joint pain and myalgias. Negative for back pain.   Neurological: Positive for weakness. Negative for dizziness, focal weakness and headaches.   Psychiatric/Behavioral: Negative for memory loss. The patient is not nervous/anxious.         Physical Exam  Temp:  [36.4 °C (97.5 °F)-36.9 °C (98.4 °F)] 36.4 °C (97.6 °F)  Pulse:  [] 91  Resp:  [18] 18  BP: ()/(56-68) 108/68  SpO2:  [94 %-97 %] 94 %    Physical Exam  Vitals signs and nursing note reviewed.   Constitutional:       General: She is not in acute distress.     Appearance: She is not ill-appearing or toxic-appearing.   HENT:      Head: Normocephalic and atraumatic.      Nose: Nose normal.   Eyes:      Extraocular Movements: Extraocular movements intact.      Pupils: Pupils are equal, round, and reactive to light.   Neck:      Musculoskeletal: Neck supple.      Thyroid: No thyromegaly.      Vascular: No JVD.   Cardiovascular:      Rate and Rhythm: Normal rate.      Heart sounds: No murmur.   Pulmonary:      Effort: No respiratory distress.      Breath sounds: Normal breath sounds.   Abdominal:      General: Bowel sounds are normal. There is no distension.      Palpations: Abdomen is soft.      Tenderness: There is no abdominal tenderness.   Musculoskeletal:         General: Swelling and tenderness present.      Comments: Right shoulder sling in place, minimal ecchymosis and edema, nontender to palpation   Skin:      General: Skin is warm and dry.      Coloration: Skin is not pale.      Findings: No erythema.   Neurological:      Mental Status: She is alert and oriented to person, place, and time.      Cranial Nerves: No cranial nerve deficit.      Sensory: No sensory deficit.      Motor: Weakness present.   Psychiatric:         Behavior: Behavior normal.         Thought Content: Thought content normal.         Fluids    Intake/Output Summary (Last 24 hours) at 11/9/2020 1037  Last data filed at 11/9/2020 0321  Gross per 24 hour   Intake 240 ml   Output 350 ml   Net -110 ml       Laboratory  Recent Labs     11/07/20  1859   WBC 15.9*   RBC 4.06*   HEMOGLOBIN 13.2   HEMATOCRIT 38.7   MCV 95.3   MCH 32.5   MCHC 34.1   RDW 46.5   PLATELETCT 370   MPV 9.7     Recent Labs     11/07/20  1859   SODIUM 131*   POTASSIUM 3.9   CHLORIDE 95*   CO2 23   GLUCOSE 122*   BUN 15   CREATININE 0.65   CALCIUM 9.3                   Imaging  DX-SHOULDER 2+ RIGHT   Final Result         Interval reduction of the right shoulder dislocation. No definite fracture is seen.      DX-HUMERUS 2+ RIGHT   Final Result            Anterior inferior right shoulder dislocation.      DX-SHOULDER 2+ RIGHT   Final Result         Anterior inferior right shoulder dislocation.           Assessment/Plan  * Anterior dislocation of shoulder, right, initial encounter  Assessment & Plan  Status post replacement in the emergency room with sedation.  Pain management  Patient normally uses cane and walker.  Currently due to her arm being in a sling she cannot use this.  The assisted living facility felt they could not take her back at this point time till she has a safe discharge.      11/8 PT/OT evaluate patient.  May need new type of walker to aid her.  Patient may require skilled nursing placement if unable to get back to her prior assisted living facility.  No pain with assistance  S/p mechanical fall    11/9 PT/OT evaluation, shoulder sling  Follow-up a.m. labs leukocytosis  noted  We will follow-up with Ortho regarding weightbearing status recommendations  PT recommending skilled nursing referral, referral placed  However patient adamantly refusing transfer, will continue therapy      Leukocytosis  Assessment & Plan  No complaints, monitor  F/u am labs      Hyponatremia  Assessment & Plan  Mild, monitor  F/u am labs    Hypertension  Assessment & Plan  Continue enalapril with parameters  Monitor vitals       VTE prophylaxis: Lovenox

## 2020-11-09 NOTE — CARE PLAN
Problem: Pain Management  Goal: Pain level will decrease to patient's comfort goal  Outcome: PROGRESSING AS EXPECTED  Flowsheets (Taken 11/9/2020 0131)  Pain Rating Scale (NPRS): 0  Note: No complaints of pain     Problem: Venous Thromboembolism (VTW)/Deep Vein Thrombosis (DVT) Prevention:  Goal: Patient will participate in Venous Thrombosis (VTE)/Deep Vein Thrombosis (DVT)Prevention Measures  Outcome: PROGRESSING AS EXPECTED  Flowsheets (Taken 11/9/2020 0131)  Pharmacologic Prophylaxis Used: LMWH: Enoxaparin(Lovenox)  Note: Lovenox ordered, given daily

## 2020-11-09 NOTE — PROGRESS NOTES
Bedside report received from Amita ALMANZAR. Assessment completed.  Pt is A&O x4. Pt on room air.   Declines pain at this time  Denies nausea.   - numbness, - tingling.   LDA: 20 g RFA SL (adequate PO fluids)  Last BM 11/8 . +flatus,   +void, purewick in place for stress incont  Regular diet. Tolerates well.   Pt up 2x assist since unable to use walker. Can stand pivot to chair. Pending weight bearing status on NIMO; discharge status unclear  Call light within reach. All needs met at this time. Fall Precautions and hourly rounding in place.

## 2020-11-09 NOTE — THERAPY
"Physical Therapy   Initial Evaluation     Patient Name: Hannah Singh  Age:  75 y.o., Sex:  female  Medical Record #: [id number]  Today's Date: 11/8/2020     Precautions: Fall Risk, Immobilizer Right Upper Extremity    Assessment  Patient is 75 y.o. female that presented to acute following GLF and subsequent R shoulder dislocation. Chart indicates dislocation was reduced in ER but no formal orders or indication in chart regarding RUE weight bearing precautions or intended use of immobilizer. PT order read \"shoulder dislocation and needs walker assistance\" but patient with immobilized RUE and therefore unable to use walker. Patient also presented with complaint of pain in R shoulder and R knee and decreased balance and activity tolerance which are limiting her ability to perform mobility at [facility]. With attempted stand patient required mod A and was unable to maintain standing due to R knee pain. She reported R knee is \"bone on bone, but I'm not having another knee surgery, I get injections.\" She was able to perform stand pivot transfer with mod A EOB to chair. At current level patient is not safe to return home but patient adamantly refusing post acute placement when discussion regarding DC disposition initiated. Will follow. Would appreciate clarification of RUE weight bearing precautions and use of immobilizer given anticipate limited progression of mobility with RUE immobilized.    Plan    Recommend Physical Therapy 4 times per week until therapy goals are met for the following treatments:  Bed Mobility, Community Re-integration, Equipment, Gait Training, Neuro Re-Education / Balance, Self Care/Home Evaluation, Therapeutic Activities and Therapeutic Exercises    DC Equipment Recommendations: Unable to determine at this time  Discharge Recommendations: Recommend post-acute placement for additional physical therapy services prior to discharge home       Subjective    \"I used to live in a 3 story house in [place], now I " "live at [facility], it is terrible.\"     Objective       11/08/20 0409   Total Time Spent   Total Time Spent (Mins) 36   Charge Group   PT Evaluation PT Evaluation Mod   Initial Contact Note    Initial Contact Note Order Received and Verified, Physical Therapy Evaluation in Progress with Full Report to Follow.   Precautions   Precautions Fall Risk;Immobilizer Right Upper Extremity   Comments no formal orders for RUE; patient in immobilizer   Vitals   O2 (LPM) 0   O2 Delivery Device None - Room Air   Pain 0 - 10 Group   Location Shoulder   Location Orientation Right   Therapist Pain Assessment During Activity;Post Activity Pain Same as Prior to Activity;Nurse Notified   Prior Living Situation   Prior Services None;Other (Comments);Housekeeping / Homemaker Services  ([facility] independent living)   Housing / Facility Independent Living Facility   Steps Into Home 0   Steps In Home 0   Equipment Owned 4-Wheel Walker   Lives with - Patient's Self Care Capacity Alone and Able to Care For Self   Comments Patient reported they deliver meals and perform housekeeping 1x/week at [facility]   Prior Level of Functional Mobility   Bed Mobility Independent   Transfer Status Independent   Ambulation Independent   Distance Ambulation (Feet)   (household)   Assistive Devices Used 4-Wheel Walker   Stairs Other (Comments)  (does not attempt)   History of Falls   History of Falls Yes   Date of Last Fall   (reason for admit, GLF and subsequent R shoulder dislocation)   Cognition    Cognition / Consciousness WDL   Level of Consciousness Alert   Comments pleasant, cooperative, retired NP, likes to direct care, limited insight into deficits and effect on function   Passive ROM Lower Body   Passive ROM Lower Body WDL   Comments functional for mobility   Active ROM Lower Body    Active ROM Lower Body  X   Comments R knee pain limiting extension in standing   Strength Lower Body   Lower Body Strength  X   Comments RLE limited by pain in function "   Sensation Lower Body   Lower Extremity Sensation   Not Tested   Lower Body Muscle Tone   Lower Body Muscle Tone  WDL   Neurological Concerns   Neurological Concerns No   Coordination Lower Body    Coordination Lower Body  X   Comments extra time required   Vision   Vision Comments NT   Balance Assessment   Sitting Balance (Static) Fair +   Sitting Balance (Dynamic) Fair   Standing Balance (Static) Poor -   Standing Balance (Dynamic) Poor -   Weight Shift Sitting Fair   Weight Shift Standing Poor   Comments patient reported R knee buckling in standing   Gait Analysis   Gait Level Of Assist Unable to Participate   Weight Bearing Status no precautions in chart or formal orders, patient with RUE in immobilizer   Bed Mobility    Supine to Sit Minimal Assist  (with HOB elevated, used rail)   Sit to Supine   (NT, left in chair)   Scooting Supervised  (seated)   Functional Mobility   Sit to Stand Moderate Assist  (unable to tolerate standing for more than a few seconds)   Bed, Chair, Wheelchair Transfer Moderate Assist   Transfer Method Stand Pivot   How much difficulty does the patient currently have...   Turning over in bed (including adjusting bedclothes, sheets and blankets)? 3   Sitting down on and standing up from a chair with arms (e.g., wheelchair, bedside commode, etc.) 1   Moving from lying on back to sitting on the side of the bed? 1   How much help from another person does the patient currently need...   Moving to and from a bed to a chair (including a wheelchair)? 2   Need to walk in a hospital room? 1   Climbing 3-5 steps with a railing? 1   6 clicks Mobility Score 9   Activity Tolerance   Sitting in Chair left in chair   Sitting Edge of Bed 10 min   Standing < 10 sec   Comments limited by R knee pain and inability to use RUE   Edema / Skin Assessment   Edema / Skin  Not Assessed   Patient / Family Goals    Patient / Family Goal #1 no post acute placement   Short Term Goals    Short Term Goal # 1 Patient  will move supine<>sitting EOB without bed features with supervision within 6tx in order to get in/out of bed   Short Term Goal # 2 Patient will move sitting<>standing with supervision within 6tx in order to initiate gait and transfers   Short Term Goal # 3 Patient will ambulate 15ft with min A within 6tx in order to access environment   Education Group   Education Provided Role of Physical Therapist   Role of Physical Therapist Patient Response Patient;Acceptance;Explanation;Verbal Demonstration   Additional Comments edu re immobilizer and no abd beyond 90 with ER   Problem List    Problems Pain;Impaired Bed Mobility;Impaired Ambulation;Impaired Transfers;Functional Strength Deficit;Impaired Balance;Decreased Activity Tolerance;Limited Knowledge of Post-Op Precautions   Anticipated Discharge Equipment and Recommendations   DC Equipment Recommendations Unable to determine at this time   Discharge Recommendations Recommend post-acute placement for additional physical therapy services prior to discharge home   Interdisciplinary Plan of Care Collaboration   IDT Collaboration with  Nursing   Patient Position at End of Therapy Seated;Call Light within Reach;Tray Table within Reach;Phone within Reach   Collaboration Comments RN aware of visit, response   Session Information   Date / Session Number  11/8-1 (1/4, 11/14)   Priority 3

## 2020-11-09 NOTE — THERAPY
"Occupational Therapy   Initial Evaluation     Patient Name: Hannah Singh  Age:  75 y.o., Sex:  female  Medical Record #: [id number]  Today's Date: 11/9/2020     Precautions  Precautions: Fall Risk, Immobilizer Right Upper Extremity  Comments: no formal orders for RUE, pt in immobilizer, assumed NWB    Assessment  Patient is 75 y.o. female with a diagnosis of GLF w/ R dislocated shoulder, reduced in ED. No formal ROM restrictions or WB status, assumed NWB since she is in an immobilizer. Per RN, she is working to clarify this w/ MD.  Additional factors influencing patient status / progress: weakness, fatigue, impaired balance.      Plan    Recommend Occupational Therapy 4 times per week until therapy goals are met for the following treatments:  Adaptive Equipment, Neuro Re-Education / Balance, Self Care/Activities of Daily Living, Therapeutic Activities and Therapeutic Exercises.    DC Equipment Recommendations: Unable to determine at this time  Discharge Recommendations: Recommend post-acute placement for additional occupational therapy services prior to discharge home     Subjective    \"Things were not like this in [employer]\"     Objective       11/09/20 0933   Prior Living Situation   Prior Services Home-Independent;Housekeeping / Homemaker Services   Housing / Facility Independent Living Facility   Bathroom Set up Walk In Shower;Grab Bars;Shower Chair   Equipment Owned 4-Wheel Walker;Tub / Shower Seat;Grab Bar(s) In Tub / Shower;Grab Bar(s) By Toilet   Lives with - Patient's Self Care Capacity Alone and Able to Care For Self   Comments Pt has meals delivered to her door as well as housekeeping services. Lives at [facility].   Prior Level of ADL Function   Self Feeding Independent   Grooming / Hygiene Independent   Bathing Independent   Dressing Independent   Toileting Independent   Prior Level of IADL Function   Medication Management Independent   Laundry Requires Assist   Kitchen Mobility " Independent   Finances Independent   Home Management Requires Assist   Shopping Requires Assist   Prior Level Of Mobility Independent With Device in Community;Independent With Device in Home   Driving / Transportation Relatives / Others Provide Transportation   Occupation (Pre-Hospital Vocational) Retired Due To Age   Cognition    Cognition / Consciousness WDL   Level of Consciousness Alert   Comments pleasant and cooperative, a bit irritable at end of session about her hospital stay.   Active ROM Upper Body   Active ROM Upper Body  X   Comments RUE NT, LUE WDL   Balance Assessment   Sitting Balance (Static) Fair +   Sitting Balance (Dynamic) Fair   Standing Balance (Static) Poor +   Standing Balance (Dynamic) Poor   Weight Shift Sitting Fair   Weight Shift Standing Poor   Comments w/ hemiwalker   Bed Mobility    Supine to Sit Minimal Assist   Scooting Minimal Assist   ADL Assessment   Upper Body Dressing Maximal Assist   Lower Body Dressing Maximal Assist  (don socks/shoes)   Toileting   (declined need for toileting at this time)   Functional Mobility   Sit to Stand Minimal Assist   Bed, Chair, Wheelchair Transfer Minimal Assist   Transfer Method Stand Pivot   Mobility EOB>sidesteps>Stand pivot to chair   Comments w/ yashira   Patient / Family Goals   Patient / Family Goal #1 to go home   Short Term Goals   Short Term Goal # 1 pt will dress UB with supv   Short Term Goal # 2 pt will dress LB with supv and AE prn   Short Term Goal # 3 pt will demo toilet txf with supv   Short Term Goal # 4 pt will demo toileting w/ supv

## 2020-11-09 NOTE — PROGRESS NOTES
Bedside report received.  Assessment complete.  A&O x 4. Patient calls appropriately.  Patient ambulates with max assist.    Patient has 0/10 pain. Pain managed with prescribed medications.  Denies N&V. Tolerating regular diet.  RUE in immobilizer sling  + void, + flatus, - BM.  Patient denies SOB.  SCD's off.    Clarification needed for weight bearing status of RUE. Dr. Palomares notified by this RN.    Review plan with of care with patient. Call light and personal belongings with in reach. Hourly rounding in place. All needs met at this time.

## 2020-11-10 LAB
ANION GAP SERPL CALC-SCNC: 9 MMOL/L (ref 7–16)
BASOPHILS # BLD AUTO: 0.7 % (ref 0–1.8)
BASOPHILS # BLD: 0.08 K/UL (ref 0–0.12)
BUN SERPL-MCNC: 17 MG/DL (ref 8–22)
CALCIUM SERPL-MCNC: 9.4 MG/DL (ref 8.5–10.5)
CHLORIDE SERPL-SCNC: 99 MMOL/L (ref 96–112)
CO2 SERPL-SCNC: 27 MMOL/L (ref 20–33)
CREAT SERPL-MCNC: 0.79 MG/DL (ref 0.5–1.4)
EOSINOPHIL # BLD AUTO: 0.35 K/UL (ref 0–0.51)
EOSINOPHIL NFR BLD: 3 % (ref 0–6.9)
ERYTHROCYTE [DISTWIDTH] IN BLOOD BY AUTOMATED COUNT: 49.5 FL (ref 35.9–50)
GLUCOSE SERPL-MCNC: 119 MG/DL (ref 65–99)
HCT VFR BLD AUTO: 38.9 % (ref 37–47)
HGB BLD-MCNC: 12.8 G/DL (ref 12–16)
IMM GRANULOCYTES # BLD AUTO: 0.08 K/UL (ref 0–0.11)
IMM GRANULOCYTES NFR BLD AUTO: 0.7 % (ref 0–0.9)
LYMPHOCYTES # BLD AUTO: 3.5 K/UL (ref 1–4.8)
LYMPHOCYTES NFR BLD: 29.7 % (ref 22–41)
MCH RBC QN AUTO: 32.2 PG (ref 27–33)
MCHC RBC AUTO-ENTMCNC: 32.9 G/DL (ref 33.6–35)
MCV RBC AUTO: 97.7 FL (ref 81.4–97.8)
MONOCYTES # BLD AUTO: 1.04 K/UL (ref 0–0.85)
MONOCYTES NFR BLD AUTO: 8.8 % (ref 0–13.4)
NEUTROPHILS # BLD AUTO: 6.75 K/UL (ref 2–7.15)
NEUTROPHILS NFR BLD: 57.1 % (ref 44–72)
NRBC # BLD AUTO: 0 K/UL
NRBC BLD-RTO: 0 /100 WBC
PLATELET # BLD AUTO: 362 K/UL (ref 164–446)
PMV BLD AUTO: 10.2 FL (ref 9–12.9)
POTASSIUM SERPL-SCNC: 4 MMOL/L (ref 3.6–5.5)
RBC # BLD AUTO: 3.98 M/UL (ref 4.2–5.4)
SODIUM SERPL-SCNC: 135 MMOL/L (ref 135–145)
WBC # BLD AUTO: 11.8 K/UL (ref 4.8–10.8)

## 2020-11-10 PROCEDURE — 97530 THERAPEUTIC ACTIVITIES: CPT | Mod: XU

## 2020-11-10 PROCEDURE — 700111 HCHG RX REV CODE 636 W/ 250 OVERRIDE (IP): Performed by: HOSPITALIST

## 2020-11-10 PROCEDURE — A9270 NON-COVERED ITEM OR SERVICE: HCPCS | Performed by: HOSPITALIST

## 2020-11-10 PROCEDURE — 36415 COLL VENOUS BLD VENIPUNCTURE: CPT

## 2020-11-10 PROCEDURE — 85025 COMPLETE CBC W/AUTO DIFF WBC: CPT

## 2020-11-10 PROCEDURE — 80048 BASIC METABOLIC PNL TOTAL CA: CPT

## 2020-11-10 PROCEDURE — 96372 THER/PROPH/DIAG INJ SC/IM: CPT

## 2020-11-10 PROCEDURE — 97535 SELF CARE MNGMENT TRAINING: CPT | Mod: XU

## 2020-11-10 PROCEDURE — 99225 PR SUBSEQUENT OBSERVATION CARE,LEVEL II: CPT | Performed by: INTERNAL MEDICINE

## 2020-11-10 PROCEDURE — 700102 HCHG RX REV CODE 250 W/ 637 OVERRIDE(OP): Performed by: HOSPITALIST

## 2020-11-10 PROCEDURE — G0378 HOSPITAL OBSERVATION PER HR: HCPCS

## 2020-11-10 RX ADMIN — DULOXETINE HYDROCHLORIDE 60 MG: 30 CAPSULE, DELAYED RELEASE ORAL at 06:48

## 2020-11-10 RX ADMIN — ENALAPRIL MALEATE 20 MG: 10 TABLET ORAL at 06:48

## 2020-11-10 RX ADMIN — BUPROPION HYDROCHLORIDE 100 MG: 100 TABLET, FILM COATED, EXTENDED RELEASE ORAL at 06:48

## 2020-11-10 RX ADMIN — ENOXAPARIN SODIUM 40 MG: 40 INJECTION SUBCUTANEOUS at 06:48

## 2020-11-10 RX ADMIN — DOCUSATE SODIUM 50 MG AND SENNOSIDES 8.6 MG 2 TABLET: 8.6; 5 TABLET, FILM COATED ORAL at 06:48

## 2020-11-10 ASSESSMENT — COGNITIVE AND FUNCTIONAL STATUS - GENERAL
TURNING FROM BACK TO SIDE WHILE IN FLAT BAD: A LITTLE
CLIMB 3 TO 5 STEPS WITH RAILING: A LOT
MOBILITY SCORE: 17
MOVING FROM LYING ON BACK TO SITTING ON SIDE OF FLAT BED: A LITTLE
MOVING TO AND FROM BED TO CHAIR: A LITTLE
SUGGESTED CMS G CODE MODIFIER MOBILITY: CK
WALKING IN HOSPITAL ROOM: A LITTLE
STANDING UP FROM CHAIR USING ARMS: A LITTLE

## 2020-11-10 ASSESSMENT — ENCOUNTER SYMPTOMS
DEPRESSION: 0
PALPITATIONS: 0
HEARTBURN: 0
MYALGIAS: 1
SHORTNESS OF BREATH: 0
FEVER: 0
FLANK PAIN: 0
NAUSEA: 0
ABDOMINAL PAIN: 0
SPEECH CHANGE: 0
MEMORY LOSS: 0
FOCAL WEAKNESS: 0
NERVOUS/ANXIOUS: 1
COUGH: 0
HEADACHES: 0
CHILLS: 0
BACK PAIN: 0
WEAKNESS: 1
SENSORY CHANGE: 0
FALLS: 1

## 2020-11-10 ASSESSMENT — GAIT ASSESSMENTS: GAIT LEVEL OF ASSIST: UNABLE TO PARTICIPATE

## 2020-11-10 NOTE — CARE PLAN
Problem: Pain Management  Goal: Pain level will decrease to patient's comfort goal  Outcome: PROGRESSING AS EXPECTED     Problem: Communication  Goal: The ability to communicate needs accurately and effectively will improve  Outcome: PROGRESSING AS EXPECTED     Problem: Safety  Goal: Will remain free from injury  Outcome: PROGRESSING AS EXPECTED     Problem: Knowledge Deficit  Goal: Knowledge of disease process/condition, treatment plan, diagnostic tests, and medications will improve  Outcome: PROGRESSING AS EXPECTED

## 2020-11-10 NOTE — THERAPY
Physical Therapy   Daily Treatment     Patient Name: Hannah Singh  Age:  75 y.o., Sex:  female  Medical Record #: [id number]  Today's Date: 11/10/2020     Precautions: Fall Risk, Immobilizer Right Upper Extremity    Assessment    Today, pt is found sitting up in chair with shoulder immobilizer loosely in place. Education done about use of immobilizer, pt disagreeing, wanting to continue to use her R UE with mami-walker. Suggestion made that pt attempt to use with her L UE so to protect the R and that we attempt to adjust the immobilizer to fit her better. Pt disagrees, puts her R hand on the mami-walker to begin walking, explaining that she is R hand dominant, and cannot use the L UE efficiently on the walker. Pt was assisted back into chair, updates with APN and plan made to return once definitive orders are in chart to answer question if pt should be in the shoulder immobilizer 24/7 or not. Pt cannot make use of a walker if she is to be in the immobilizer. Pt is unsafe to d/c to home in current condition. PT to follow.     Plan    Continue current treatment plan.    DC Equipment Recommendations: Unable to determine at this time  Discharge Recommendations: Recommend post-acute placement for additional physical therapy services prior to discharge home       Objective       11/10/20 1206   Precautions   Comments Weightbearing as tolerated with shoulder immobilizer use for 6 weeks Will need a hemiwalker for ambulation Range of motion exercises after patient seen by orthopedic surgery in 2 weeks   Cognition    Comments irritable, distractible, needs clear instruction, but not receptive to education attempted.    Balance   Comments standing with mami walker, did not attempt ambulation due to pt insisting on using R UE despite it being in immobilizer.    Gait Analysis   Gait Level Of Assist Unable to Participate   Functional Mobility   Sit to Stand Minimal Assist   Bed, Chair, Wheelchair Transfer Minimal Assist   Short  Term Goals    Short Term Goal # 1 Patient will move supine<>sitting EOB without bed features with supervision within 6tx in order to get in/out of bed   Goal Outcome # 1 goal not met   Short Term Goal # 2 Patient will move sitting<>standing with supervision within 6tx in order to initiate gait and transfers   Goal Outcome # 2 Goal not met   Short Term Goal # 3 Patient will ambulate 15ft with min A within 6tx in order to access environment   Goal Outcome # 3 Goal not met   Anticipated Discharge Equipment and Recommendations   DC Equipment Recommendations Unable to determine at this time   Discharge Recommendations Recommend post-acute placement for additional physical therapy services prior to discharge home

## 2020-11-10 NOTE — THERAPY
Occupational Therapy  Daily Treatment     Patient Name: Hannah Singh  Age:  75 y.o., Sex:  female  Medical Record #: [id number]  Today's Date: 11/10/2020     Precautions  Precautions: (P) Fall Risk, Immobilizer Right Upper Extremity  Comments: (P) Per hospitalist R UE WBAL in arm imobalizer, pt want to use R UE and hemiwalker to ambulate.  we will need clairification on this    Assessment     Pt currently limited by decreased functional mobility, activity tolerance, cognition, sensation, strength, AROM, coordination, balance, and pain which are affecting pt's ability to complete ADLs/IADLs at baseline. Pt would benefit from OT services in the acute care setting to maximize functional recovery.     Plan    DC Equipment Recommendations: Unable to determine at this time  Discharge Recommendations: (P) Other, need clarification.  Current MD order for R UE is WBAT with R UE in sling, pt wants to use R UE with A hemiwalker to ambulate, unclear if this okay the way the order is written.  If pt unable to use use R UE for mobilizing and ADLs she will likely need placement, or 24/7 assist.       11/10/20 1400   Activities of Daily Living   Upper Body Dressing Maximal Assist   Lower Body Dressing Maximal Assist   Functional Mobility   Sit to Stand Minimal Assist   Bed, Chair, Wheelchair Transfer Minimal Assist   Short Term Goals   Short Term Goal # 1 pt will dress UB with supv   Goal Outcome # 1 Goal not met   Short Term Goal # 2 pt will dress LB with supv and AE prn   Goal Outcome # 2 Goal not met   Short Term Goal # 3 pt will demo toilet txf with supv   Goal Outcome # 3 Goal not met

## 2020-11-10 NOTE — DISCHARGE PLANNING
Care Transition Team Discharge Planning                   Discharge Plan:  SNF    This YOHAN RAMIREZ and YOHAN Cam Hospitalist met with Pt at bedside to talk to her about her discharge disposition. Per Pt,  [facility] cannot take her now because of Covid. Per Pt [facility] does not do much for her too.    Explained to Pt that both PT and OT will work with her again so we can safely discharge.    This RN JAMES spoke with Pt's Daughter, Leonila Mayes who states that she is DPOA for [employer].   Explained to Leonila that OT recommends SNF.    This RN CM faxed a list of SNFs in [place] for Leonila to review. Leonila to talk to Pt also.    This RN JAMES will continue to assist Pt with discharge as needed,

## 2020-11-10 NOTE — PROGRESS NOTES
[facility] Medicine Daily Progress Note    Date of Service  11/10/2020    Chief Complaint  75 y.o. female admitted 11/7/2020 with shoulder dislocation.    Hospital Course    This is a 75 y.o. female history of hypertension, bladder incontinence who presented 11/7/2020 with a fall today while bending over to try and clean up a spilled soda and ended up falling onto her right shoulder.  She did not lose consciousness.  Subsequently she had pain and is found to have an anterior dislocated shoulder.  Patient was given sedation in the emergency room and had reduction back in the place.  The patient normally uses a cane or walker with her right hand secondary to her shoulder being in a sling temporarily she is unable to handle her current walker or cane.  Her assisted living facility is unable to fully assist at this point in time recommended further evaluation before discharge to their facility.  Patient will be seen by PT/OT and consideration of skilled nursing if needed.  Currently the patient is quite verbally robust and pleasant.  On evaluation the patient notes that she had had some scabbing and bruising on her knees she states she had slid down a ramp at her house recently.  She states she otherwise has not been falling before that.    Interval Problem Update  11/9 Patient reports pain controlled with immobilizer in place  Adamantly refusing skilled nursing facility placement    Discussed with orthopedics, recommendation for weightbearing as tolerated with shoulder immobilizer in place, but will need outpatient follow-up with orthopedic surgery and 2 weeks for further recommendations and range of motion exercises    Seen by PT, recommending skilled placement, however patient refusing  We will continue therapy, OT to follow-up  We will need to demonstrate appropriate activity tolerance prior to discharge home with home health in assisted living    11/10 sitting up in chair, states that she was able to get into her  chair, with standby assist  Shoulder immobilizer in place  Recommendations to continue immobilizer use per orthopedic recommendations to allow for capsule healing, and outpatient follow-up  Patient reports prior history of shoulder dislocation x3, follows up closely with [facility] and would like orthopedic consult  Consult placed  Therapy recommending skilled nursing referral, due to max assistance requirements  Patient still adamantly refusing skilled nursing care, stating inadequate quick care at these facilities  At this point we will continue therapy inpatient, will need to follow-up regarding assisted living assistance, and ability to meet patient's needs  Patient agitated and tangential on exam, stating her care in [place] does not meet her needs, as compared to the care she per was provided in [place]  Plan of care reviewed in detail with this patient multiple times, hospitalist RN to follow-up    Consultants/Specialty  None    Code Status  Full Code    Disposition  Lives at assisted living facility  Continue therapy  Patient requesting discharge with home health, refusing skilled nursing placement    Review of Systems  Review of Systems   Constitutional: Negative for chills and fever.   HENT: Negative for congestion.    Respiratory: Negative for cough and shortness of breath.    Cardiovascular: Negative for palpitations and leg swelling.   Gastrointestinal: Negative for abdominal pain, heartburn and nausea.   Genitourinary: Negative for dysuria and flank pain.   Musculoskeletal: Positive for falls, joint pain and myalgias. Negative for back pain.   Neurological: Positive for weakness. Negative for sensory change, speech change, focal weakness and headaches.   Psychiatric/Behavioral: Negative for depression and memory loss. The patient is nervous/anxious.         Physical Exam  Temp:  [36.5 °C (97.7 °F)-36.8 °C (98.2 °F)] 36.7 °C (98.1 °F)  Pulse:  [] 87  Resp:  [17-19] 18  BP: (114-126)/(70-82) 118/72  SpO2:  [94  %-98 %] 96 %    Physical Exam  Vitals signs and nursing note reviewed.   Constitutional:       General: She is not in acute distress.     Appearance: She is not ill-appearing or diaphoretic.   HENT:      Head: Normocephalic and atraumatic.      Nose: Nose normal.   Eyes:      Extraocular Movements: Extraocular movements intact.      Pupils: Pupils are equal, round, and reactive to light.   Neck:      Musculoskeletal: Neck supple.      Thyroid: No thyromegaly.      Vascular: No JVD.   Cardiovascular:      Rate and Rhythm: Normal rate.      Heart sounds: No murmur.   Pulmonary:      Effort: No respiratory distress.      Breath sounds: Normal breath sounds.   Abdominal:      General: There is no distension.      Palpations: There is no mass.      Tenderness: There is no abdominal tenderness. There is no guarding.   Musculoskeletal:         General: Swelling and tenderness present.      Comments: Right shoulder sling in place, minimal ecchymosis and edema, nontender to palpation   Skin:     General: Skin is warm and dry.      Coloration: Skin is not jaundiced or pale.   Neurological:      Mental Status: She is alert and oriented to person, place, and time.      Cranial Nerves: No cranial nerve deficit.      Sensory: No sensory deficit.      Motor: Weakness present.      Coordination: Coordination abnormal.   Psychiatric:         Behavior: Behavior normal.         Thought Content: Thought content normal.         Fluids    Intake/Output Summary (Last 24 hours) at 11/10/2020 1236  Last data filed at 11/10/2020 0730  Gross per 24 hour   Intake 360 ml   Output 670 ml   Net -310 ml       Laboratory  Recent Labs     11/07/20  1859 11/10/20  0418   WBC 15.9* 11.8*   RBC 4.06* 3.98*   HEMOGLOBIN 13.2 12.8   HEMATOCRIT 38.7 38.9   MCV 95.3 97.7   MCH 32.5 32.2   MCHC 34.1 32.9*   RDW 46.5 49.5   PLATELETCT 370 362   MPV 9.7 10.2     Recent Labs     11/07/20  1859 11/10/20  0418   SODIUM 131* 135   POTASSIUM 3.9 4.0   CHLORIDE 95*  99   CO2 23 27   GLUCOSE 122* 119*   BUN 15 17   CREATININE 0.65 0.79   CALCIUM 9.3 9.4                   Imaging  DX-SHOULDER 2+ RIGHT   Final Result         Interval reduction of the right shoulder dislocation. No definite fracture is seen.      DX-HUMERUS 2+ RIGHT   Final Result            Anterior inferior right shoulder dislocation.      DX-SHOULDER 2+ RIGHT   Final Result         Anterior inferior right shoulder dislocation.           Assessment/Plan  * Anterior dislocation of shoulder, right, initial encounter  Assessment & Plan  Status post replacement in the emergency room with sedation.  Pain management  Patient normally uses cane and walker.  Currently due to her arm being in a sling she cannot use this.  The assisted living facility felt they could not take her back at this point time till she has a safe discharge.      11/8 PT/OT evaluate patient.  May need new type of walker to aid her.  Patient may require skilled nursing placement if unable to get back to her prior assisted living facility.  No pain with assistance  S/p mechanical fall    11/9 PT/OT evaluation, shoulder sling  Follow-up a.m. labs leukocytosis noted  We will follow-up with Ortho regarding weightbearing status recommendations  PT recommending skilled nursing referral, referral placed  However patient adamantly refusing transfer, will continue therapy    11/10 orthopedic consult per patient request, states that she follows up closely with [facility]  Patient seen by OT, requiring max assistance  Still adamantly refusing skilled nursing referral  Continue inpatient therapy  Assisted living facility will need to meet patient's needs prior to discharge  Hospice RN to follow-up regarding patient concerns      Leukocytosis  Assessment & Plan  No complaints, monitor  F/u am labs    11/10 resolving  No signs of infection      Hyponatremia  Assessment & Plan  Mild, monitor  F/u am labs    11/10 resolved    Hypertension  Assessment & Plan  Continue  enalapril with parameters  Monitor vitals       VTE prophylaxis: Lovenox

## 2020-11-11 ENCOUNTER — APPOINTMENT (OUTPATIENT)
Dept: RADIOLOGY | Facility: MEDICAL CENTER | Age: 75
End: 2020-11-11
Attending: ORTHOPAEDIC SURGERY
Payer: MEDICARE

## 2020-11-11 PROCEDURE — A9270 NON-COVERED ITEM OR SERVICE: HCPCS | Performed by: INTERNAL MEDICINE

## 2020-11-11 PROCEDURE — G0378 HOSPITAL OBSERVATION PER HR: HCPCS

## 2020-11-11 PROCEDURE — 73020 X-RAY EXAM OF SHOULDER: CPT | Mod: RT

## 2020-11-11 PROCEDURE — 700102 HCHG RX REV CODE 250 W/ 637 OVERRIDE(OP): Performed by: HOSPITALIST

## 2020-11-11 PROCEDURE — 700102 HCHG RX REV CODE 250 W/ 637 OVERRIDE(OP): Performed by: INTERNAL MEDICINE

## 2020-11-11 PROCEDURE — 96372 THER/PROPH/DIAG INJ SC/IM: CPT

## 2020-11-11 PROCEDURE — 99225 PR SUBSEQUENT OBSERVATION CARE,LEVEL II: CPT | Performed by: INTERNAL MEDICINE

## 2020-11-11 PROCEDURE — A9270 NON-COVERED ITEM OR SERVICE: HCPCS | Performed by: HOSPITALIST

## 2020-11-11 PROCEDURE — 700111 HCHG RX REV CODE 636 W/ 250 OVERRIDE (IP): Performed by: HOSPITALIST

## 2020-11-11 RX ORDER — DIPHENHYDRAMINE HYDROCHLORIDE, ZINC ACETATE 2; .1 G/100G; G/100G
CREAM TOPICAL 2 TIMES DAILY PRN
Status: DISCONTINUED | OUTPATIENT
Start: 2020-11-11 | End: 2020-11-17 | Stop reason: HOSPADM

## 2020-11-11 RX ADMIN — BUPROPION HYDROCHLORIDE 100 MG: 100 TABLET, FILM COATED, EXTENDED RELEASE ORAL at 06:00

## 2020-11-11 RX ADMIN — ENOXAPARIN SODIUM 40 MG: 40 INJECTION SUBCUTANEOUS at 06:00

## 2020-11-11 RX ADMIN — DOCUSATE SODIUM 50 MG AND SENNOSIDES 8.6 MG 2 TABLET: 8.6; 5 TABLET, FILM COATED ORAL at 06:00

## 2020-11-11 RX ADMIN — DULOXETINE HYDROCHLORIDE 60 MG: 30 CAPSULE, DELAYED RELEASE ORAL at 06:00

## 2020-11-11 RX ADMIN — ENALAPRIL MALEATE 20 MG: 10 TABLET ORAL at 06:00

## 2020-11-11 RX ADMIN — Medication: at 17:34

## 2020-11-11 ASSESSMENT — ENCOUNTER SYMPTOMS
DIAPHORESIS: 0
MYALGIAS: 0
NAUSEA: 0
MEMORY LOSS: 0
PALPITATIONS: 0
BACK PAIN: 0
HEADACHES: 0
FOCAL WEAKNESS: 0
WEAKNESS: 1
FALLS: 0
ABDOMINAL PAIN: 0
NERVOUS/ANXIOUS: 0
FLANK PAIN: 0
FEVER: 0
DEPRESSION: 0
SHORTNESS OF BREATH: 0

## 2020-11-11 ASSESSMENT — PAIN DESCRIPTION - PAIN TYPE: TYPE: ACUTE PAIN

## 2020-11-11 NOTE — DISCHARGE PLANNING
Care Transition Team Discharge Planning                   Discharge Plan:  SNF    This RN CM met with Pt at bedside and explained to her that the Care Team's recommendation is for her to discharge to a SNF.   This RN CM gave Pt a list of SNF choice for [facility] .  Per Pt, she will look into their rating.      This RN CM informed Pt that I will see her later for her choices.     11:42 This RN CM received a call from Leonila Gerber, Pt's daughter and explained that I need Pt's consent for SNF choices.    Per YOHAN Dick, Pt agreed to be referred to the following: [facility], [facility] and [facility].     12:04 Taty Liaison at [facility] spoke with Pt. Pt also agreed to be referred to [facility].  Choice form was faxed to [facility].    Per Leonila , daughter Pt agreed with the above choices.

## 2020-11-11 NOTE — CONSULTS
11/10/2020    Reason for consultation: Right shoulder dislocation    Consultation on Hannah Singh at the request of Dr. Palomares for a right shoulder dislocation status post reduction in the ER.  The patient is a 75 y.o. female who presents with a right shoulder dislocation status post reduction in the ER due to a fall at her assisted living when trying to retrieve her meal from outside the door.  The patient noted immediate pain and inability to move the affected extremity due to pain.  They were evaluated in the ER, and reduction in the ER was successful.  She does rely on use of a walker and as such she was admitted to the hospital for mobilization with PT discharge planning.  She continues to require a great deal of assistance but refuses skilled care placement.  She states very emphatically that the rehab facilities in [place] are not great and she has no desire to be admitted to 1 of these facilities.  She denies any pain in her shoulder at this time.  She states that she does have a prior dislocation of the shoulder treated by Dr. Bojorquez.  She states she has not really had any problem with the shoulder since that time.  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  She denies numbness, paresthesias, loss of consciousness or other symptoms.    Past Medical History:   Diagnosis Date   • Hypertension        Surgical History: Left ankle ORIF by Dr. Munroe    Medications  No current facility-administered medications on file prior to encounter.      Current Outpatient Medications on File Prior to Encounter   Medication Sig Dispense Refill   • enalapril (VASOTEC) 20 MG tablet Take 20 mg by mouth every morning.     • FLUoxetine (PROZAC) 20 MG Cap Take 20 mg by mouth every morning.     • Multiple Vitamins-Minerals (CENTRUM SILVER) Tab Take 1 Tab by mouth every day. Takes in morning or with lunch.     • buPROPion SR (WELLBUTRIN-SR) 100 MG TABLET SR 12 HR Take 100 mg by mouth every day.     • oxybutynin  "(DITROPAN) 5 MG Tab Take 5 mg by mouth at bedtime as needed (bladder). Indications: Frequent Urination     • DULoxetine (CYMBALTA) 60 MG Cap DR Particles delayed-release capsule Take 60 mg by mouth every day.         Allergies  Patient has no known allergies.    ROS  Per HPI. All other systems were reviewed and found to be negative    History reviewed. No pertinent family history.    Social History     Socioeconomic History   • Marital status:      Spouse name: Not on file   • Number of children: Not on file   • Years of education: Not on file   • Highest education level: Not on file   Occupational History   • Not on file   Social Needs   • Financial resource strain: Not on file   • Food insecurity     Worry: Not on file     Inability: Not on file   • Transportation needs     Medical: Not on file     Non-medical: Not on file   Tobacco Use   • Smoking status: Never Smoker   • Smokeless tobacco: Never Used   Substance and Sexual Activity   • Alcohol use: Not Currently   • Drug use: Never   • Sexual activity: Not on file   Lifestyle   • Physical activity     Days per week: Not on file     Minutes per session: Not on file   • Stress: Not on file   Relationships   • Social connections     Talks on phone: Not on file     Gets together: Not on file     Attends [employer] service: Not on file     Active member of club or organization: Not on file     Attends meetings of clubs or organizations: Not on file     Relationship status: Not on file   • Intimate partner violence     Fear of current or ex partner: Not on file     Emotionally abused: Not on file     Physically abused: Not on file     Forced sexual activity: Not on file   Other Topics Concern   • Not on file   Social History Narrative   • Not on file       Physical Exam  Vitals  /66   Pulse 99   Temp 36.1 °C (97 °F) (Temporal)   Resp 19   Ht 1.549 m (5' 1\")   Wt 74.2 kg (163 lb 8 oz)   SpO2 96%   General: Well Developed, Well Nourished, no acute " distress  Psychiatric: Alert and oriented x3, appropriate responses to questions but often tangential.  HEENT: Normocephalic, atraumatic  Eyes: Anicteric, PERRL  Neck: Midline trachea, no pain with range of motion  Chest: Symmetric expansion of the chest wall, no distress.  Heart: RRR, palpable peripheral pulses  Abdomen: Soft, NT, ND  Skin: Intact, no open wounds  Extremities: No deformity right shoulder.  No pain with glenohumeral motion.  Formal range of motion not tested secondary to her injury  Neuro: Intact light touch sensation of the hand in all distributions.  Intact motor function median radial ulnar and axillary nerve distribution  Vascular: 2+ radial pulse on the right, Capillary refill <2 seconds    Radiographs:  DX-SHOULDER 2+ RIGHT   Final Result         Interval reduction of the right shoulder dislocation. No definite fracture is seen.      DX-HUMERUS 2+ RIGHT   Final Result            Anterior inferior right shoulder dislocation.      DX-SHOULDER 2+ RIGHT   Final Result         Anterior inferior right shoulder dislocation.          Laboratory Values  Recent Labs     11/10/20  0418   WBC 11.8*   RBC 3.98*   HEMOGLOBIN 12.8   HEMATOCRIT 38.9   MCV 97.7   MCH 32.2   MCHC 32.9*   RDW 49.5   PLATELETCT 362   MPV 10.2     Recent Labs     11/10/20  0418   SODIUM 135   POTASSIUM 4.0   CHLORIDE 99   CO2 27   GLUCOSE 119*   BUN 17             Impression:    #1  Right shoulder dislocation status post closed reduction    Plan:    Reduction should be confirmed with an axillary shoulder view which I will order.  She should remain in her sling for approximately 2 weeks.  No range of motion at this time.  She may not use that arm to weight-bear and should remain nonweightbearing for now.  In 2 weeks we can wean her out of the sling and begin a shoulder stabilization program with with PT. she should follow-up at the [place] [facility] in approximately 2 weeks for repeat radiographs.

## 2020-11-11 NOTE — PROGRESS NOTES
[facility] Daily Progress Note    Date of Service  11/11/2020    Chief Complaint  75 y.o. female admitted 11/7/2020 with shoulder dislocation.    Hospital Course    This is a 75 y.o. female history of hypertension, bladder incontinence who presented 11/7/2020 with a fall today while bending over to try and clean up a spilled soda and ended up falling onto her right shoulder.  She did not lose consciousness.  Subsequently she had pain and is found to have an anterior dislocated shoulder.  Patient was given sedation in the emergency room and had reduction back in the place.  The patient normally uses a cane or walker with her right hand secondary to her shoulder being in a sling temporarily she is unable to handle her current walker or cane.  Her assisted living facility is unable to fully assist at this point in time recommended further evaluation before discharge to their facility.  Patient will be seen by PT/OT and consideration of skilled nursing if needed.  Currently the patient is quite verbally robust and pleasant.  On evaluation the patient notes that she had had some scabbing and bruising on her knees she states she had slid down a ramp at her house recently.  She states she otherwise has not been falling before that.    Interval Problem Update  11/9 Patient reports pain controlled with immobilizer in place  Adamantly refusing skilled nursing facility placement    Discussed with orthopedics, recommendation for weightbearing as tolerated with shoulder immobilizer in place, but will need outpatient follow-up with orthopedic surgery and 2 weeks for further recommendations and range of motion exercises    Seen by PT, recommending skilled placement, however patient refusing  We will continue therapy, OT to follow-up  We will need to demonstrate appropriate activity tolerance prior to discharge home with home health in assisted living    11/10 sitting up in chair, states that she was able to get into her  chair, with standby assist  Shoulder immobilizer in place  Recommendations to continue immobilizer use per orthopedic recommendations to allow for capsule healing, and outpatient follow-up  Patient reports prior history of shoulder dislocation x3, follows up closely with [facility] and would like orthopedic consult  Consult placed  Therapy recommending skilled nursing referral, due to max assistance requirements  Patient still adamantly refusing skilled nursing care, stating inadequate quick care at these facilities  At this point we will continue therapy inpatient, will need to follow-up regarding assisted living assistance, and ability to meet patient's needs  Patient agitated and tangential on exam, stating her care in [place] does not meet her needs, as compared to the care she per was provided in [place]  Plan of care reviewed in detail with this patient multiple times, hospitalist RN to follow-up    11/11 pleasant today, [facility] [place].  Denies pain, aware of NWB recommendations and sling use  Cont therapy    Consultants/Specialty  None    Code Status  Full Code    Disposition  Lives at assisted living facility  snf referral, dtr is POA  Continue therapy  Patient requesting discharge with home health, refusing skilled nursing placement    Review of Systems  Review of Systems   Constitutional: Negative for diaphoresis, fever and malaise/fatigue.   HENT: Negative for congestion.    Respiratory: Negative for shortness of breath.    Cardiovascular: Negative for palpitations and leg swelling.   Gastrointestinal: Negative for abdominal pain and nausea.   Genitourinary: Negative for dysuria and flank pain.   Musculoskeletal: Negative for back pain, falls, joint pain and myalgias.   Neurological: Positive for weakness. Negative for focal weakness and headaches.   Psychiatric/Behavioral: Negative for depression and memory loss. The patient is not  nervous/anxious.         Physical Exam  Temp:  [36.1 °C (97 °F)-37.2 °C (98.9 °F)] 36.3 °C (97.4 °F)  Pulse:  [69-99] 89  Resp:  [17-19] 17  BP: (103-115)/(56-75) 107/65  SpO2:  [94 %-96 %] 95 %    Physical Exam  Vitals signs and nursing note reviewed.   Constitutional:       General: She is not in acute distress.     Appearance: She is not ill-appearing.   HENT:      Head: Normocephalic and atraumatic.      Nose: Nose normal.   Eyes:      Extraocular Movements: Extraocular movements intact.      Pupils: Pupils are equal, round, and reactive to light.   Neck:      Musculoskeletal: Neck supple.      Thyroid: No thyromegaly.      Vascular: No JVD.   Cardiovascular:      Rate and Rhythm: Normal rate.      Heart sounds: No murmur.   Pulmonary:      Effort: No respiratory distress.      Breath sounds: Normal breath sounds.   Abdominal:      General: There is no distension.      Tenderness: There is no abdominal tenderness.   Musculoskeletal:         General: Swelling present. No tenderness.      Comments: Right shoulder sling in place, minimal ecchymosis and edema, nontender to palpation   Skin:     General: Skin is warm and dry.      Coloration: Skin is not jaundiced.   Neurological:      Mental Status: She is alert and oriented to person, place, and time.      Cranial Nerves: No cranial nerve deficit.      Motor: Weakness present.      Coordination: Coordination abnormal.   Psychiatric:         Behavior: Behavior normal.         Thought Content: Thought content normal.         Fluids    Intake/Output Summary (Last 24 hours) at 11/11/2020 1021  Last data filed at 11/11/2020 0300  Gross per 24 hour   Intake 240 ml   Output 300 ml   Net -60 ml       Laboratory  Recent Labs     11/10/20  0418   WBC 11.8*   RBC 3.98*   HEMOGLOBIN 12.8   HEMATOCRIT 38.9   MCV 97.7   MCH 32.2   MCHC 32.9*   RDW 49.5   PLATELETCT 362   MPV 10.2     Recent Labs     11/10/20  0418   SODIUM 135   POTASSIUM 4.0   CHLORIDE 99   CO2 27   GLUCOSE  119*   BUN 17   CREATININE 0.79   CALCIUM 9.4                   Imaging  DX-SHOULDER 1 VIEW RIGHT   Final Result      1.  There is no evidence of dislocation on this single axillary view of the right shoulder.      DX-SHOULDER 2+ RIGHT   Final Result         Interval reduction of the right shoulder dislocation. No definite fracture is seen.      DX-HUMERUS 2+ RIGHT   Final Result            Anterior inferior right shoulder dislocation.      DX-SHOULDER 2+ RIGHT   Final Result         Anterior inferior right shoulder dislocation.           Assessment/Plan  * Anterior dislocation of shoulder, right, initial encounter  Assessment & Plan  Status post replacement in the emergency room with sedation.  Pain management  Patient normally uses cane and walker.  Currently due to her arm being in a sling she cannot use this.  The assisted living facility felt they could not take her back at this point time till she has a safe discharge.      11/8 PT/OT evaluate patient.  May need new type of walker to aid her.  Patient may require skilled nursing placement if unable to get back to her prior assisted living facility.  No pain with assistance  S/p mechanical fall    11/9 PT/OT evaluation, shoulder sling  Follow-up a.m. labs leukocytosis noted  We will follow-up with Ortho regarding weightbearing status recommendations  PT recommending skilled nursing referral, referral placed  However patient adamantly refusing transfer, will continue therapy    11/10 orthopedic consult per patient request, states that she follows up closely with [facility]  Patient seen by OT, requiring max assistance  Still adamantly refusing skilled nursing referral  Continue inpatient therapy  Assisted living facility will need to meet patient's needs prior to discharge  hospitalist RN to follow-up regarding patient concerns    11/11  As per ortho, R axillary shoulder xray  - nwb to RUE x 2 weeks  - outpatient f/u with surgery in 2 weeks  - continuous use of  shoulder sling  snf referral      Leukocytosis  Assessment & Plan  No complaints, monitor  F/u am labs    11/10 resolving  No signs of infection      Hyponatremia  Assessment & Plan  Mild, monitor  F/u am labs    11/10 resolved    Hypertension  Assessment & Plan  Continue enalapril with parameters  Monitor vitals       VTE prophylaxis: Lovenox

## 2020-11-11 NOTE — DISCHARGE PLANNING
Care Transition Team Discharge Planning                   Discharge Plan:  SNF    This RN CM left a  VM to Leonila, Pt's daughter /DPOA for [employer] Finance to follow up her choice and Pt's choice for SNF so we can start the referral.     Will continue to follow and assist with discharge as needed.

## 2020-11-11 NOTE — DISCHARGE PLANNING
Received Choice form at [phone]  Agency/Facility Name: 1)[facility], [facility]) [facility], 3) [facility], 4)[facility]  Referral sent per Choice form @ [phone]

## 2020-11-12 PROCEDURE — 700111 HCHG RX REV CODE 636 W/ 250 OVERRIDE (IP): Performed by: HOSPITALIST

## 2020-11-12 PROCEDURE — 97530 THERAPEUTIC ACTIVITIES: CPT | Mod: XU

## 2020-11-12 PROCEDURE — 99225 PR SUBSEQUENT OBSERVATION CARE,LEVEL II: CPT | Performed by: INTERNAL MEDICINE

## 2020-11-12 PROCEDURE — 97535 SELF CARE MNGMENT TRAINING: CPT | Mod: XU

## 2020-11-12 PROCEDURE — 96372 THER/PROPH/DIAG INJ SC/IM: CPT

## 2020-11-12 PROCEDURE — G0378 HOSPITAL OBSERVATION PER HR: HCPCS

## 2020-11-12 PROCEDURE — 700102 HCHG RX REV CODE 250 W/ 637 OVERRIDE(OP): Performed by: HOSPITALIST

## 2020-11-12 PROCEDURE — A9270 NON-COVERED ITEM OR SERVICE: HCPCS | Performed by: HOSPITALIST

## 2020-11-12 RX ADMIN — DULOXETINE HYDROCHLORIDE 60 MG: 30 CAPSULE, DELAYED RELEASE ORAL at 04:16

## 2020-11-12 RX ADMIN — Medication 1 EACH: at 12:05

## 2020-11-12 RX ADMIN — BUPROPION HYDROCHLORIDE 100 MG: 100 TABLET, FILM COATED, EXTENDED RELEASE ORAL at 04:16

## 2020-11-12 RX ADMIN — ENALAPRIL MALEATE 20 MG: 10 TABLET ORAL at 05:23

## 2020-11-12 RX ADMIN — ENOXAPARIN SODIUM 40 MG: 40 INJECTION SUBCUTANEOUS at 04:16

## 2020-11-12 ASSESSMENT — ENCOUNTER SYMPTOMS
ABDOMINAL PAIN: 0
FEVER: 0
DEPRESSION: 0
DIZZINESS: 0
BACK PAIN: 0
NERVOUS/ANXIOUS: 0
HEARTBURN: 0
COUGH: 0
FLANK PAIN: 0
SHORTNESS OF BREATH: 0
MEMORY LOSS: 0
PALPITATIONS: 0
WEAKNESS: 1
MYALGIAS: 0
HEADACHES: 0
FOCAL WEAKNESS: 0

## 2020-11-12 ASSESSMENT — COGNITIVE AND FUNCTIONAL STATUS - GENERAL
HELP NEEDED FOR BATHING: A LOT
PERSONAL GROOMING: A LITTLE
DRESSING REGULAR UPPER BODY CLOTHING: A LOT
EATING MEALS: A LITTLE
TURNING FROM BACK TO SIDE WHILE IN FLAT BAD: A LITTLE
WALKING IN HOSPITAL ROOM: A LOT
SUGGESTED CMS G CODE MODIFIER MOBILITY: CL
CLIMB 3 TO 5 STEPS WITH RAILING: A LOT
MOVING TO AND FROM BED TO CHAIR: A LITTLE
DRESSING REGULAR LOWER BODY CLOTHING: A LOT
MOVING FROM LYING ON BACK TO SITTING ON SIDE OF FLAT BED: A LOT
TOILETING: A LOT
STANDING UP FROM CHAIR USING ARMS: A LOT
DAILY ACTIVITIY SCORE: 14
MOBILITY SCORE: 14
SUGGESTED CMS G CODE MODIFIER DAILY ACTIVITY: CK

## 2020-11-12 ASSESSMENT — GAIT ASSESSMENTS
GAIT LEVEL OF ASSIST: MODERATE ASSIST
ASSISTIVE DEVICE: HAND HELD ASSIST
DISTANCE (FEET): 5
DEVIATION: DECREASED BASE OF SUPPORT

## 2020-11-12 NOTE — DISCHARGE PLANNING
Care Transition Team Discharge Planning                   Discharge Plan: SNF    This RN CM received a call from Leonila Mayes, Pt's daughter/DPOA for Health [employer] Finance and informed her that [facility] and [facility] declined Pt.     Informed Leonila that Pt has been accepted by [facility] and that insurance Auth is pending.   Will submit PASRR.

## 2020-11-12 NOTE — DISCHARGE PLANNING
[facility] Transitional Care Coordination     Referral from:  Dr. Palomares    Facesheet indicates: KEKE VELÁZQUEZ.  Unfortunately, [facility] is not a benefit.      Potential Rehab Diagnosis: Debility    Chart review indicates patient may have on going medical management and may have therapy needs to possibly meet inpatient rehab facility criteria with the goal of returning to community.    D/C support: TBD     Physiatry consultation denied per protocol.      No DX to warrant an inpatient Rehab program.  If unable to return to [facility], recommend SNF.  [facility] will not follow.  Please reach out to myself @ [phone] with any questions.  Mireille RAMIREZ is aware.     Thank you for the referral.

## 2020-11-12 NOTE — PROGRESS NOTES
Pt laying in bed, call light within reach, bed lowered and locked, fall education reinforced. Pt is A&Ox4 and on room air. Pt lung sounds are clear in all lobes, bowel sounds are normoactive in all four quadrants, heart sounds are within defined limits. PT IV is clean,dry, intact,and patent and saline locked. Pt is up x1 with a 4 point one hand walker with an unsteady shuffling gait. Pt denies pain at this time. Pt right arm is in a sling with adequate perfusion and warm extremities.

## 2020-11-12 NOTE — THERAPY
Physical Therapy   Daily Treatment     Patient Name: Hannah Singh  Age:  75 y.o., Sex:  female  Medical Record #: [id number]  Today's Date: 11/12/2020     Precautions: Fall Risk, Sling Right Upper Extremity, Non Weight Bearing Right Upper Extremity    Assessment    Today, pt struggle with sit to stand from bs chair, needing mod A. Pt struggles with chronic R knee problems and remains at high risk for falls. Pt tolerated short walk to bs chair x 5 feet with min A by hand held assist. PT to continue.     Plan    Continue current treatment plan.    DC Equipment Recommendations: Unable to determine at this time  Discharge Recommendations: Recommend post-acute placement for additional physical therapy services prior to discharge home       Objective       11/12/20 1154   Gait Analysis   Gait Level Of Assist Moderate Assist   Assistive Device Hand Held Assist   Distance (Feet) 5   Weight Bearing Status NWB R UE   Bed Mobility    Scooting Minimal Assist   Functional Mobility   Sit to Stand Moderate Assist   Bed, Chair, Wheelchair Transfer Moderate Assist   Comments first to upright chair, but pt asking for recliner. Pt was then assisted to reclinler   Short Term Goals    Short Term Goal # 1 Patient will move supine<>sitting EOB without bed features with supervision within 6tx in order to get in/out of bed   Goal Outcome # 1 goal not met   Short Term Goal # 2 Patient will move sitting<>standing with supervision within 6tx in order to initiate gait and transfers   Goal Outcome # 2 Goal not met   Short Term Goal # 3 Patient will ambulate 15ft with min A within 6tx in order to access environment   Goal Outcome # 3 Goal not met   Anticipated Discharge Equipment and Recommendations   DC Equipment Recommendations Unable to determine at this time   Discharge Recommendations Recommend post-acute placement for additional physical therapy services prior to discharge home

## 2020-11-12 NOTE — THERAPY
"Occupational Therapy  Daily Treatment     Patient Name: Hannah Singh  Age:  75 y.o., Sex:  female  Medical Record #: [id number]  Today's Date: 11/12/2020     Precautions  Precautions: Fall Risk, Non Weight Bearing Right Upper Extremity, Sling Right Upper Extremity  Comments: No L shoulder ROM, NWB R UE, sling on for two weeks R UE per ortho consult, Dr. Solano    Assessment    Pt continues to demonstrate decreased strength, decreased balance, decreased activity tolerance, decreased carryover of NWB RUE, decreased knowledge of hemitechnique for ADLs and decreased safety awareness/insight into deficits, limiting pt's safety/indep with ADLs/IADLs/mobility. Recommend cog eval, this therapist suspects pt may be masking mild cog deficits    Plan    Continue current treatment plan.    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 Easily agitated, but redirectable. Poor carryover of NWB RUE. This therapist suspects pt may be masking for mild cog defiits   Balance   Sitting Balance (Static) Fair   Sitting Balance (Dynamic) Fair   Standing Balance (Static) Fair -   Standing Balance (Dynamic) Fair -   Weight Shift Sitting Fair   Weight Shift Standing Fair   Skilled Intervention Verbal Cuing;Tactile Cuing;Postural Facilitation   Comments hemiwalker   Bed Mobility    Supine to Sit   (seated in chair pre/post session)   Sit to Supine   (seated in chair pre/post session)   Scooting Supervised  (chair)   Skilled Intervention Verbal Cuing   Activities of Daily Living   Grooming Moderate Assist  (min A " oral cares, max A comb hair d/t excessive knots)   Lower Body Dressing Maximal Assist  (don underwear, pt reports using reacher at home)   Toileting Moderate Assist  (posterior hygiene)   Skilled Intervention Verbal Cuing;Sequencing;Tactile Cuing   Comments At end of session, pt donned underwear but declined standing to hike. Pt also refused to doff underwear. Pt left seated in chair with underwear below hips, nursing informed   Functional Mobility   Sit to Stand Moderate Assist   Bed, Chair, Wheelchair Transfer Moderate Assist   Toilet Transfers Minimal Assist  (grab bar assist)   Transfer Method Stand Step  (hemiwalker)   Mobility chair>toilet>sink>chair, hemiwalker, CGA for ambulation   Skilled Intervention Verbal Cuing;Tactile Cuing;Sequencing   Comments Pt requires significantly increased time for functional ambulation with hemiwalker, takes very small/slow steps   Activity Tolerance   Sitting in Chair pre/post session   Sitting Edge of Bed NT   Standing 10 mins   Patient / Family Goals   Patient / Family Goal #1 to go home   Goal #1 Outcome Goal not met   Short Term Goals   Short Term Goal # 1 pt will dress UB with supv   Goal Outcome # 1 Goal not met   Short Term Goal # 2 pt will dress LB with supv and AE prn   Goal Outcome # 2 Goal not met   Short Term Goal # 3 pt will demo toilet txf with supv   Goal Outcome # 3 Progressing as expected   Short Term Goal # 4 pt will demo toileting w/ supv   Goal Outcome # 4 Progressing as expected   Anticipated Discharge Equipment and Recommendations   DC Equipment Recommendations Unable to determine at this time   Discharge Recommendations Recommend post-acute placement for additional occupational therapy services prior to discharge home

## 2020-11-12 NOTE — PROGRESS NOTES
[facility] Medicine Daily Progress Note    Date of Service  11/12/2020    Chief Complaint  75 y.o. female admitted 11/7/2020 with shoulder dislocation.    Hospital Course    This is a 75 y.o. female history of hypertension, bladder incontinence who presented 11/7/2020 with a fall today while bending over to try and clean up a spilled soda and ended up falling onto her right shoulder.  She did not lose consciousness.  Subsequently she had pain and is found to have an anterior dislocated shoulder.  Patient was given sedation in the emergency room and had reduction back in the place.  The patient normally uses a cane or walker with her right hand secondary to her shoulder being in a sling temporarily she is unable to handle her current walker or cane.  Her assisted living facility is unable to fully assist at this point in time recommended further evaluation before discharge to their facility.  Patient will be seen by PT/OT and consideration of skilled nursing if needed.  Currently the patient is quite verbally robust and pleasant.  On evaluation the patient notes that she had had some scabbing and bruising on her knees she states she had slid down a ramp at her house recently.  She states she otherwise has not been falling before that.    Interval Problem Update  11/9 Patient reports pain controlled with immobilizer in place  Adamantly refusing skilled nursing facility placement    Discussed with orthopedics, recommendation for weightbearing as tolerated with shoulder immobilizer in place, but will need outpatient follow-up with orthopedic surgery and 2 weeks for further recommendations and range of motion exercises    Seen by PT, recommending skilled placement, however patient refusing  We will continue therapy, OT to follow-up  We will need to demonstrate appropriate activity tolerance prior to discharge home with home health in assisted living    11/10 sitting up in chair, states that she was able to get into her  chair, with standby assist  Shoulder immobilizer in place  Recommendations to continue immobilizer use per orthopedic recommendations to allow for capsule healing, and outpatient follow-up  Patient reports prior history of shoulder dislocation x3, follows up closely with IVAN and would like orthopedic consult  Consult placed  Therapy recommending skilled nursing referral, due to max assistance requirements  Patient still adamantly refusing skilled nursing care, stating inadequate quick care at these facilities  At this point we will continue therapy inpatient, will need to follow-up regarding assisted living assistance, and ability to meet patient's needs  Patient agitated and tangential on exam, stating her care in [place] does not meet her needs, as compared to the care she per was provided in [place]  Plan of care reviewed in detail with this patient multiple times, hospitalist RN to follow-up    11/11 pleasant today, still states that [place] rehab centers do not know what they are doing compared to [place].  Denies pain, aware of NWB recommendations and sling use  Cont therapy    11/12 patient now agreeable to discharge to rehab  Denies pain    Consultants/Specialty  None    Code Status  Full Code    Disposition  Lives at assisted living facility  snf referral, dtr is POA  Rehab referral,  to assist  Continue therapy      Review of Systems  Review of Systems   Constitutional: Negative for fever and malaise/fatigue.   HENT: Negative for congestion.    Respiratory: Negative for cough and shortness of breath.    Cardiovascular: Negative for palpitations and leg swelling.   Gastrointestinal: Negative for abdominal pain and heartburn.   Genitourinary: Negative for dysuria and flank pain.   Musculoskeletal: Negative for back pain, joint pain and myalgias.   Neurological: Positive for weakness. Negative for dizziness, focal weakness and headaches.   Psychiatric/Behavioral: Negative for depression and memory loss. The  patient is not nervous/anxious.         Physical Exam  Temp:  [36.1 °C (97 °F)-37.3 °C (99.2 °F)] 36.1 °C (97 °F)  Pulse:  [] 84  Resp:  [18] 18  BP: ()/(48-71) 110/71  SpO2:  [94 %-99 %] 95 %    Physical Exam  Vitals signs and nursing note reviewed.   Constitutional:       General: She is not in acute distress.     Appearance: She is not ill-appearing or toxic-appearing.   HENT:      Head: Normocephalic and atraumatic.      Nose: Nose normal.   Eyes:      Extraocular Movements: Extraocular movements intact.      Pupils: Pupils are equal, round, and reactive to light.   Neck:      Musculoskeletal: Neck supple.      Thyroid: No thyromegaly.      Vascular: No JVD.   Cardiovascular:      Rate and Rhythm: Normal rate.      Heart sounds: No murmur.   Pulmonary:      Effort: Pulmonary effort is normal. No respiratory distress.   Abdominal:      General: There is no distension.      Palpations: There is no mass.   Musculoskeletal:         General: Swelling present. No tenderness.      Comments: Right shoulder sling in place, minimal ecchymosis and edema, nontender to palpation   Skin:     General: Skin is dry.   Neurological:      Mental Status: She is alert and oriented to person, place, and time.      Cranial Nerves: No cranial nerve deficit.      Sensory: No sensory deficit.      Motor: Weakness present.   Psychiatric:         Thought Content: Thought content normal.         Fluids    Intake/Output Summary (Last 24 hours) at 11/12/2020 1023  Last data filed at 11/12/2020 0335  Gross per 24 hour   Intake 360 ml   Output 600 ml   Net -240 ml       Laboratory  Recent Labs     11/10/20  0418   WBC 11.8*   RBC 3.98*   HEMOGLOBIN 12.8   HEMATOCRIT 38.9   MCV 97.7   MCH 32.2   MCHC 32.9*   RDW 49.5   PLATELETCT 362   MPV 10.2     Recent Labs     11/10/20  0418   SODIUM 135   POTASSIUM 4.0   CHLORIDE 99   CO2 27   GLUCOSE 119*   BUN 17   CREATININE 0.79   CALCIUM 9.4                   Imaging  DX-SHOULDER 1 VIEW  RIGHT   Final Result      1.  There is no evidence of dislocation on this single axillary view of the right shoulder.      DX-SHOULDER 2+ RIGHT   Final Result         Interval reduction of the right shoulder dislocation. No definite fracture is seen.      DX-HUMERUS 2+ RIGHT   Final Result            Anterior inferior right shoulder dislocation.      DX-SHOULDER 2+ RIGHT   Final Result         Anterior inferior right shoulder dislocation.           Assessment/Plan  * Anterior dislocation of shoulder, right, initial encounter  Assessment & Plan  Status post replacement in the emergency room with sedation.  Pain management  Patient normally uses cane and walker.  Currently due to her arm being in a sling she cannot use this.  The assisted living facility felt they could not take her back at this point time till she has a safe discharge.      11/8 PT/OT evaluate patient.  May need new type of walker to aid her.  Patient may require skilled nursing placement if unable to get back to her prior assisted living facility.  No pain with assistance  S/p mechanical fall    11/9 PT/OT evaluation, shoulder sling  Follow-up a.m. labs leukocytosis noted  We will follow-up with Ortho regarding weightbearing status recommendations  PT recommending skilled nursing referral, referral placed  However patient adamantly refusing transfer, will continue therapy    11/10 orthopedic consult per patient request, states that she follows up closely with IVAN  Patient seen by OT, requiring max assistance  Still adamantly refusing skilled nursing referral  Continue inpatient therapy  Assisted living facility will need to meet patient's needs prior to discharge  hospitalist RN to follow-up regarding patient concerns    11/11  As per ortho, R axillary shoulder xray  - nwb to RUE x 2 weeks  - outpatient f/u with surgery in 2 weeks  - continuous use of shoulder sling  snf referral    11/12 encourage activity  Rehab referral,  to  assist  Patient now agreeable      Leukocytosis  Assessment & Plan  No complaints, monitor  F/u am labs    11/10 resolving  No signs of infection      Hyponatremia  Assessment & Plan  Mild, monitor  F/u am labs    11/10 resolved    Hypertension  Assessment & Plan  Continue enalapril with parameters  Monitor vitals       VTE prophylaxis: Lovenox

## 2020-11-12 NOTE — DISCHARGE PLANNING
Agency/Facility Name: [facility]  Spoke To: Per Epic  Outcome: Pt accepted and they are running Ins. Auth now.    Agency/Facility Name: [facility]  Spoke To: Per Epic  Outcome: Pt accepted. Waiting Insurance Auth

## 2020-11-12 NOTE — DISCHARGE PLANNING
Care Transition Team Discharge Planning                   Discharge Plan:  SNF    Per Yahaira Miller, Clinical Admissions Coordinator at [facility], Pt's Insurance [employer] is not contracted with [facility] and that Pt's diagnosis does not fit criteria for IP Rehab so Pt was declined.     Pt is pending SNF acceptance at [facility].

## 2020-11-12 NOTE — PROGRESS NOTES
"Patient denies need for pain medication. Sitting up for breakfast with standby assistance. Right arm in sling per Ortho MD order, patient verbalized weight bearing restriction. MD orders reviewed, all questions answered. /69   Pulse 63   Temp 36.7 °C (98 °F) (Temporal)   Resp 18   Ht 1.549 m (5' 1\")   Wt 74.2 kg (163 lb 8 oz)   SpO2 94%   BMI 30.89 kg/m²     "

## 2020-11-13 PROBLEM — E87.1 HYPONATREMIA: Status: RESOLVED | Noted: 2020-11-08 | Resolved: 2020-11-13

## 2020-11-13 PROCEDURE — 700111 HCHG RX REV CODE 636 W/ 250 OVERRIDE (IP): Performed by: HOSPITALIST

## 2020-11-13 PROCEDURE — G0378 HOSPITAL OBSERVATION PER HR: HCPCS

## 2020-11-13 PROCEDURE — 99224 PR SUBSEQUENT OBSERVATION CARE,LEVEL I: CPT | Performed by: INTERNAL MEDICINE

## 2020-11-13 PROCEDURE — 96372 THER/PROPH/DIAG INJ SC/IM: CPT

## 2020-11-13 PROCEDURE — A9270 NON-COVERED ITEM OR SERVICE: HCPCS | Performed by: HOSPITALIST

## 2020-11-13 PROCEDURE — 700102 HCHG RX REV CODE 250 W/ 637 OVERRIDE(OP): Performed by: HOSPITALIST

## 2020-11-13 RX ADMIN — Medication: at 18:04

## 2020-11-13 RX ADMIN — Medication: at 10:20

## 2020-11-13 RX ADMIN — ENOXAPARIN SODIUM 40 MG: 40 INJECTION SUBCUTANEOUS at 05:27

## 2020-11-13 RX ADMIN — DULOXETINE HYDROCHLORIDE 60 MG: 30 CAPSULE, DELAYED RELEASE ORAL at 05:27

## 2020-11-13 RX ADMIN — BUPROPION HYDROCHLORIDE 100 MG: 100 TABLET, FILM COATED, EXTENDED RELEASE ORAL at 05:27

## 2020-11-13 RX ADMIN — ENALAPRIL MALEATE 20 MG: 10 TABLET ORAL at 05:27

## 2020-11-13 ASSESSMENT — ENCOUNTER SYMPTOMS
MYALGIAS: 0
PALPITATIONS: 0
DEPRESSION: 0
SHORTNESS OF BREATH: 0
COUGH: 0
WEAKNESS: 1
HEADACHES: 0
NERVOUS/ANXIOUS: 0
BACK PAIN: 0
MEMORY LOSS: 0
FEVER: 0
ABDOMINAL PAIN: 0
HEARTBURN: 0
FOCAL WEAKNESS: 0
DIZZINESS: 0
FLANK PAIN: 0

## 2020-11-13 ASSESSMENT — PAIN DESCRIPTION - PAIN TYPE: TYPE: ACUTE PAIN

## 2020-11-13 NOTE — PROGRESS NOTES
Pt laying in bed, call light within reach, bed lowered and locked, fall education reinforced. Pt is A&Ox4 and on room air. Pt lung sounds are clear in all lobes, bowel sounds are normoactive in all four quadrants, heart sounds are within defined limits. PT IV is clean,dry, intact,and patent and saline locked. Pt is up x1 with a 4 point one hand walker with an unsteady shuffling gait. Pt denies pain at this time. Pt right arm is in a sling with adequate perfusion and warm extremities. Pt denies pain or discomfort at this time

## 2020-11-13 NOTE — DISCHARGE PLANNING
Care Transition Team Discharge Planning    Anticipated Discharge Information  Discharge Disposition: D/T to SNF with medicare cert w/planned hosp IP readmit (83)              Discharge Plan:  [facility]    Pt has a completed PASRR and the number is [id number].

## 2020-11-14 PROCEDURE — 700111 HCHG RX REV CODE 636 W/ 250 OVERRIDE (IP): Performed by: HOSPITALIST

## 2020-11-14 PROCEDURE — 96372 THER/PROPH/DIAG INJ SC/IM: CPT

## 2020-11-14 PROCEDURE — G0378 HOSPITAL OBSERVATION PER HR: HCPCS

## 2020-11-14 PROCEDURE — 99224 PR SUBSEQUENT OBSERVATION CARE,LEVEL I: CPT | Performed by: INTERNAL MEDICINE

## 2020-11-14 PROCEDURE — A9270 NON-COVERED ITEM OR SERVICE: HCPCS | Performed by: HOSPITALIST

## 2020-11-14 PROCEDURE — 700102 HCHG RX REV CODE 250 W/ 637 OVERRIDE(OP): Performed by: HOSPITALIST

## 2020-11-14 RX ADMIN — DULOXETINE HYDROCHLORIDE 60 MG: 30 CAPSULE, DELAYED RELEASE ORAL at 04:19

## 2020-11-14 RX ADMIN — ENOXAPARIN SODIUM 40 MG: 40 INJECTION SUBCUTANEOUS at 04:19

## 2020-11-14 RX ADMIN — BUPROPION HYDROCHLORIDE 100 MG: 100 TABLET, FILM COATED, EXTENDED RELEASE ORAL at 04:19

## 2020-11-14 RX ADMIN — ENALAPRIL MALEATE 20 MG: 10 TABLET ORAL at 04:19

## 2020-11-14 ASSESSMENT — ENCOUNTER SYMPTOMS
HEADACHES: 0
PALPITATIONS: 0
NERVOUS/ANXIOUS: 0
BACK PAIN: 0
FEVER: 0
DIZZINESS: 0
MYALGIAS: 0
HEARTBURN: 0
FOCAL WEAKNESS: 0
MEMORY LOSS: 0
ABDOMINAL PAIN: 0
COUGH: 0
DEPRESSION: 0
FLANK PAIN: 0
WEAKNESS: 1
SHORTNESS OF BREATH: 0

## 2020-11-14 ASSESSMENT — PAIN DESCRIPTION - PAIN TYPE: TYPE: ACUTE PAIN

## 2020-11-14 NOTE — DISCHARGE PLANNING
Anticipated Discharge Disposition:   [facility]    Action:    Pt admitted with anterior dislocation of shoulder, right side.    YOHAN RAMIREZ spoke with Desirae with [facility].  Insurance authorization with [employer] pending.  They will not have an update until this Monday 11-.    RN CM spoke to patient.  She was sitting in chair with right arm sling in place.  Pt stated she lives at [facility] in [facility].  She uses a four wheel walker which is in the room.  Pt able to use her smart phone.  She is a retired RN.  Informed patient that the insurance authorization for the SNF has not been obtained and we will follow up with [facility] this Monday, 11-.     Barriers to Discharge:    Insurance authorization    Plan:    F/U with [facility] admissions.    Care Transition Team Assessment    Information Source  Orientation : Oriented x 4  Information Given By: Patient  Who is responsible for making decisions for patient? : Patient    Readmission Evaluation  Is this a readmission?: No    Elopement Risk  Legal Hold: No  Ambulatory or Self Mobile in Wheelchair: No-Not an Elopement Risk  Disoriented: No  Psychiatric Symptoms: None  History of Wandering: No  Elopement this Admit: No  Vocalizing Wanting to Leave: No  Displays Behaviors, Body Language Wanting to Leave: No-Not at Risk for Elopement  Elopement Risk: Not at Risk for Elopement    Interdisciplinary Discharge Planning  Lives with - Patient's Self Care Capacity: Alone and Able to Care For Self  Patient or legal guardian wants to designate a caregiver: No  Housing / Facility: Independent Living Facility  Prior Services: Home-Independent, Housekeeping / Homemaker Services    Discharge Preparedness  What is your plan after discharge?: Skilled nursing facility  What are your discharge supports?: Child  Prior Functional Level: Ambulatory, Independent with Activities of Daily Living, Independent with Medication  Management, Uses Walker  Difficulity with ADLs: Bathing, Dressing, Toileting, Walking  Difficulity with IADLs: Cooking, Driving, Laundry, Managing medication, Shopping    Functional Assesment  Prior Functional Level: Ambulatory, Independent with Activities of Daily Living, Independent with Medication Management, Uses Walker    Finances  Financial Barriers to Discharge: No  Prescription Coverage: Yes    Vision / Hearing Impairment  Right Eye Vision: Wears Glasses  Left Eye Vision: Wears Glasses         Advance Directive  Advance Directive?: None    Domestic Abuse  Have you ever been the victim of abuse or violence?: No  Physical Abuse or Sexual Abuse: No  Verbal Abuse or Emotional Abuse: No  Possible Abuse/Neglect Reported to:: Not Applicable         Discharge Risks or Barriers  Discharge risks or barriers?: No    Anticipated Discharge Information  Discharge Disposition: D/T to SNF with Medicare cert in anticipation of skilled care (03)  Discharge Contact Phone Number: [phone]

## 2020-11-14 NOTE — PROGRESS NOTES
Pt laying in bed, call light within reach, bed lowered and locked, fall education reinforced. Pt is A&Ox4 and on room air. Pt lung sounds are diminished in the lower lobes, bowel sounds are normoactive in all four quadrants, heart sounds are within defined limits. PT IV is clean,dry, intact,and patent and saline locked. Pt is up x1 with a 4 point one hand walker with an unsteady shuffling gait. Pt denies pain at this time. Pt right arm is in a sling with adequate perfusion and warm extremities. Pt denies pain or discomfort at this time.

## 2020-11-14 NOTE — PROGRESS NOTES
[facility] Medicine Daily Progress Note    Date of Service  11/13/2020    Chief Complaint  75 y.o. female admitted 11/7/2020 with shoulder dislocation.    Hospital Course    This is a 75 y.o. female history of hypertension, bladder incontinence who presented 11/7/2020 with a fall while bending over to try and clean up a spilled soda and ended up falling onto her right shoulder.  She did not lose consciousness.  Subsequently she had pain and is found to have an anterior dislocated shoulder.  Patient was given sedation in the emergency room and had reduction back in the place.  The patient normally uses a cane or walker with her right hand secondary to her shoulder being in a sling temporarily she is unable to handle her current walker or cane.  Her assisted living facility is unable to fully assist at this point in time recommended further evaluation before discharge to their facility.  Patient will be seen by PT/OT and consideration of skilled nursing if needed.  Currently the patient is quite verbally robust and pleasant.  On evaluation the patient notes that she had had some scabbing and bruising on her knees she states she had slid down a ramp at her house recently.  She states she otherwise has not been falling before that.    Interval Problem Update  11/9 Patient reports pain controlled with immobilizer in place  Adamantly refusing skilled nursing facility placement    Discussed with orthopedics, recommendation for weightbearing as tolerated with shoulder immobilizer in place, but will need outpatient follow-up with orthopedic surgery and 2 weeks for further recommendations and range of motion exercises    Seen by PT, recommending skilled placement, however patient refusing  We will continue therapy, OT to follow-up  We will need to demonstrate appropriate activity tolerance prior to discharge home with home health in assisted living    11/10 sitting up in chair, states that she was able to get into her chair,  with standby assist  Shoulder immobilizer in place  Recommendations to continue immobilizer use per orthopedic recommendations to allow for capsule healing, and outpatient follow-up  Patient reports prior history of shoulder dislocation x3, follows up closely with IVAN and would like orthopedic consult  Consult placed  Therapy recommending skilled nursing referral, due to max assistance requirements  Patient still adamantly refusing skilled nursing care, stating inadequate quick care at these facilities  At this point we will continue therapy inpatient, will need to follow-up regarding assisted living assistance, and ability to meet patient's needs  Patient agitated and tangential on exam, stating her care in [place] does not meet her needs, as compared to the care she per was provided in [place]  Plan of care reviewed in detail with this patient multiple times, hospitalist RN to follow-up    11/11 pleasant today, still states that [place] rehab centers do not know what they are doing compared to [place].  Denies pain, aware of NWB recommendations and sling use  Cont therapy    11/12 patient now agreeable to discharge to rehab  Denies pain    11/13 resting comfortably watching TV, no acute complaints.  Desires discharge and physical therapy soon as possible.    Consultants/Specialty  None    Code Status  Full Code    Disposition  Lives at assisted living facility  snf referral, dtr is POA  Rehab referral,  to assist  Continue therapy      Review of Systems  Review of Systems   Constitutional: Negative for fever and malaise/fatigue.   HENT: Negative for congestion.    Respiratory: Negative for cough and shortness of breath.    Cardiovascular: Negative for palpitations and leg swelling.   Gastrointestinal: Negative for abdominal pain and heartburn.   Genitourinary: Negative for dysuria and flank pain.   Musculoskeletal: Negative for back pain, joint pain and myalgias.   Neurological: Positive for weakness. Negative  for dizziness, focal weakness and headaches.   Psychiatric/Behavioral: Negative for depression and memory loss. The patient is not nervous/anxious.         Physical Exam  Temp:  [36 °C (96.8 °F)-36.9 °C (98.5 °F)] 36.9 °C (98.5 °F)  Pulse:  [] 85  Resp:  [17-19] 17  BP: (114-143)/(62-79) 138/65  SpO2:  [94 %-96 %] 96 %    Physical Exam  Vitals signs and nursing note reviewed.   Constitutional:       General: She is not in acute distress.     Appearance: She is not ill-appearing or toxic-appearing.   HENT:      Head: Normocephalic and atraumatic.      Nose: Nose normal.   Eyes:      Extraocular Movements: Extraocular movements intact.      Pupils: Pupils are equal, round, and reactive to light.   Neck:      Musculoskeletal: Neck supple.      Thyroid: No thyromegaly.      Vascular: No JVD.   Cardiovascular:      Rate and Rhythm: Normal rate.      Heart sounds: No murmur.   Pulmonary:      Effort: Pulmonary effort is normal. No respiratory distress.   Abdominal:      General: There is no distension.      Palpations: There is no mass.   Musculoskeletal:         General: Swelling present. No tenderness.      Comments: Right shoulder sling in place, minimal ecchymosis and edema, nontender to palpation.  Pulses intact distally, capillary refill less than 2 seconds.   Skin:     General: Skin is dry.   Neurological:      Mental Status: She is alert and oriented to person, place, and time.      Cranial Nerves: No cranial nerve deficit.      Sensory: No sensory deficit.      Motor: Weakness present.   Psychiatric:         Thought Content: Thought content normal.         Fluids    Intake/Output Summary (Last 24 hours) at 11/13/2020 1813  Last data filed at 11/13/2020 0354  Gross per 24 hour   Intake 240 ml   Output 0 ml   Net 240 ml       Laboratory                        Imaging  DX-SHOULDER 1 VIEW RIGHT   Final Result      1.  There is no evidence of dislocation on this single axillary view of the right shoulder.       DX-SHOULDER 2+ RIGHT   Final Result         Interval reduction of the right shoulder dislocation. No definite fracture is seen.      DX-HUMERUS 2+ RIGHT   Final Result            Anterior inferior right shoulder dislocation.      DX-SHOULDER 2+ RIGHT   Final Result         Anterior inferior right shoulder dislocation.           Assessment/Plan  * Anterior dislocation of shoulder, right, initial encounter- (present on admission)  Assessment & Plan  Status post replacement in the emergency room with sedation.  Pain management  Patient normally uses cane and walker.  Currently due to her arm being in a sling she cannot use this.  The assisted living facility felt they could not take her back at this point time till she has a safe discharge.      11/8 PT/OT evaluate patient.  May need new type of walker to aid her.  Patient may require skilled nursing placement if unable to get back to her prior assisted living facility.  No pain with assistance  S/p mechanical fall    11/9 PT/OT evaluation, shoulder sling  Follow-up a.m. labs leukocytosis noted  We will follow-up with Ortho regarding weightbearing status recommendations  PT recommending skilled nursing referral, referral placed  However patient adamantly refusing transfer, will continue therapy    11/10 orthopedic consult per patient request, states that she follows up closely with IV[facility]  Patient seen by OT, requiring max assistance  Still adamantly refusing skilled nursing referral  Continue inpatient therapy  Assisted living facility will need to meet patient's needs prior to discharge  hospitalist RN to follow-up regarding patient concerns    11/11  As per ortho, R axillary shoulder xray  - nwb to RUE x 2 weeks  - outpatient f/u with surgery in 2 weeks  - continuous use of shoulder sling  snf referral    11/12 encourage activity  Rehab referral,  to assist  Patient now agreeable    11/13 medically cleared for discharge to skilled nursing or inpatient  rehab.  Would benefit from physical therapy.    Leukocytosis- (present on admission)  Assessment & Plan  No complaints, monitor  F/u am labs    11/10 resolving  No signs of infection    Hypertension- (present on admission)  Assessment & Plan  Continue enalapril with parameters  Monitor vitals       VTE prophylaxis: Lovenox

## 2020-11-15 PROCEDURE — 700102 HCHG RX REV CODE 250 W/ 637 OVERRIDE(OP): Performed by: HOSPITALIST

## 2020-11-15 PROCEDURE — 96372 THER/PROPH/DIAG INJ SC/IM: CPT

## 2020-11-15 PROCEDURE — A9270 NON-COVERED ITEM OR SERVICE: HCPCS | Performed by: HOSPITALIST

## 2020-11-15 PROCEDURE — 99224 PR SUBSEQUENT OBSERVATION CARE,LEVEL I: CPT | Performed by: INTERNAL MEDICINE

## 2020-11-15 PROCEDURE — G0378 HOSPITAL OBSERVATION PER HR: HCPCS

## 2020-11-15 PROCEDURE — 700111 HCHG RX REV CODE 636 W/ 250 OVERRIDE (IP): Performed by: HOSPITALIST

## 2020-11-15 RX ADMIN — ENALAPRIL MALEATE 20 MG: 10 TABLET ORAL at 06:11

## 2020-11-15 RX ADMIN — ENOXAPARIN SODIUM 40 MG: 40 INJECTION SUBCUTANEOUS at 06:11

## 2020-11-15 RX ADMIN — Medication: at 20:19

## 2020-11-15 RX ADMIN — DULOXETINE HYDROCHLORIDE 60 MG: 30 CAPSULE, DELAYED RELEASE ORAL at 06:11

## 2020-11-15 RX ADMIN — BUPROPION HYDROCHLORIDE 100 MG: 100 TABLET, FILM COATED, EXTENDED RELEASE ORAL at 06:11

## 2020-11-15 ASSESSMENT — ENCOUNTER SYMPTOMS
FLANK PAIN: 0
FEVER: 0
PALPITATIONS: 0
MEMORY LOSS: 0
SHORTNESS OF BREATH: 0
ABDOMINAL PAIN: 0
HEADACHES: 0
FOCAL WEAKNESS: 0
DIZZINESS: 0
HEARTBURN: 0
NERVOUS/ANXIOUS: 0
MYALGIAS: 0
COUGH: 0
WEAKNESS: 1
BACK PAIN: 0
DEPRESSION: 0

## 2020-11-15 NOTE — PROGRESS NOTES
[facility] Medicine Daily Progress Note    Date of Service  11/14/2020    Chief Complaint  75 y.o. female admitted 11/7/2020 with shoulder dislocation.    Hospital Course    This is a 75 y.o. female history of hypertension, bladder incontinence who presented 11/7/2020 with a fall while bending over to try and clean up a spilled soda and ended up falling onto her right shoulder.  She did not lose consciousness.  Subsequently she had pain and is found to have an anterior dislocated shoulder.  Patient was given sedation in the emergency room and had reduction back in the place.  The patient normally uses a cane or walker with her right hand secondary to her shoulder being in a sling temporarily she is unable to handle her current walker or cane.  Her assisted living facility is unable to fully assist at this point in time recommended further evaluation before discharge to their facility.  Patient will be seen by PT/OT and consideration of skilled nursing if needed.  Currently the patient is quite verbally robust and pleasant.  On evaluation the patient notes that she had had some scabbing and bruising on her knees she states she had slid down a ramp at her house recently.  She states she otherwise has not been falling before that.    Interval Problem Update  11/9 Patient reports pain controlled with immobilizer in place  Adamantly refusing skilled nursing facility placement    Discussed with orthopedics, recommendation for weightbearing as tolerated with shoulder immobilizer in place, but will need outpatient follow-up with orthopedic surgery and 2 weeks for further recommendations and range of motion exercises    Seen by PT, recommending skilled placement, however patient refusing  We will continue therapy, OT to follow-up  We will need to demonstrate appropriate activity tolerance prior to discharge home with home health in assisted living    11/10 sitting up in chair, states that she was able to get into her chair,  with standby assist  Shoulder immobilizer in place  Recommendations to continue immobilizer use per orthopedic recommendations to allow for capsule healing, and outpatient follow-up  Patient reports prior history of shoulder dislocation x3, follows up closely with [facility] and would like orthopedic consult  Consult placed  Therapy recommending skilled nursing referral, due to max assistance requirements  Patient still adamantly refusing skilled nursing care, stating inadequate quick care at these facilities  At this point we will continue therapy inpatient, will need to follow-up regarding assisted living assistance, and ability to meet patient's needs  Patient agitated and tangential on exam, stating her care in [place] does not meet her needs, as compared to the care she per was provided in [place]  Plan of care reviewed in detail with this patient multiple times, hospitalist RN to follow-up    11/11 pleasant today, [facility] [place].  Denies pain, aware of NWB recommendations and sling use  Cont therapy    11/12 patient now agreeable to discharge to rehab  Denies pain    11/13 resting comfortably watching TV, no acute complaints.  Desires discharge and physical therapy soon as possible.    11/14 no acute complaints.  Patient is looking forward to discharging and having regular physical therapy.    Consultants/Specialty  None    Code Status  Full Code    Disposition  Lives at assisted living facility  snf referral, dtr is POA  Rehab referral,  to assist  Continue therapy      Review of Systems  Review of Systems   Constitutional: Negative for fever and malaise/fatigue.   HENT: Negative for congestion.    Respiratory: Negative for cough and shortness of breath.    Cardiovascular: Negative for palpitations and leg swelling.   Gastrointestinal: Negative for abdominal pain and heartburn.   Genitourinary: Negative for dysuria and flank pain.    Musculoskeletal: Negative for back pain, joint pain and myalgias.   Neurological: Positive for weakness. Negative for dizziness, focal weakness and headaches.   Psychiatric/Behavioral: Negative for depression and memory loss. The patient is not nervous/anxious.         Physical Exam  Temp:  [36.4 °C (97.6 °F)-36.6 °C (97.8 °F)] 36.5 °C (97.7 °F)  Pulse:  [64-95] 64  Resp:  [16-18] 18  BP: (113-128)/(58-68) 113/58  SpO2:  [91 %-95 %] 94 %    Physical Exam  Vitals signs and nursing note reviewed.   Constitutional:       General: She is not in acute distress.     Appearance: She is not ill-appearing or toxic-appearing.   HENT:      Head: Normocephalic and atraumatic.      Nose: Nose normal.   Eyes:      Extraocular Movements: Extraocular movements intact.      Pupils: Pupils are equal, round, and reactive to light.   Neck:      Musculoskeletal: Neck supple.      Thyroid: No thyromegaly.      Vascular: No JVD.   Cardiovascular:      Rate and Rhythm: Normal rate.      Heart sounds: No murmur.   Pulmonary:      Effort: Pulmonary effort is normal. No respiratory distress.   Abdominal:      General: There is no distension.      Palpations: There is no mass.   Musculoskeletal:         General: Swelling present. No tenderness.      Comments: Right shoulder sling in place, minimal ecchymosis and edema, nontender to palpation.  Pulses intact distally, capillary refill less than 2 seconds.   Skin:     General: Skin is dry.   Neurological:      Mental Status: She is alert and oriented to person, place, and time.      Cranial Nerves: No cranial nerve deficit.      Sensory: No sensory deficit.      Motor: Weakness present.   Psychiatric:         Thought Content: Thought content normal.         Fluids    Intake/Output Summary (Last 24 hours) at 11/14/2020 1806  Last data filed at 11/14/2020 0342  Gross per 24 hour   Intake 360 ml   Output --   Net 360 ml       Laboratory                        Imaging  DX-SHOULDER 1 VIEW RIGHT    Final Result      1.  There is no evidence of dislocation on this single axillary view of the right shoulder.      DX-SHOULDER 2+ RIGHT   Final Result         Interval reduction of the right shoulder dislocation. No definite fracture is seen.      DX-HUMERUS 2+ RIGHT   Final Result            Anterior inferior right shoulder dislocation.      DX-SHOULDER 2+ RIGHT   Final Result         Anterior inferior right shoulder dislocation.           Assessment/Plan  * Anterior dislocation of shoulder, right, initial encounter- (present on admission)  Assessment & Plan  Status post replacement in the emergency room with sedation.  Pain management  Patient normally uses cane and walker.  Currently due to her arm being in a sling she cannot use this.  The assisted living facility felt they could not take her back at this point time till she has a safe discharge.      11/8 PT/OT evaluate patient.  May need new type of walker to aid her.  Patient may require skilled nursing placement if unable to get back to her prior assisted living facility.  No pain with assistance  S/p mechanical fall    11/9 PT/OT evaluation, shoulder sling  Follow-up a.m. labs leukocytosis noted  We will follow-up with Ortho regarding weightbearing status recommendations  PT recommending skilled nursing referral, referral placed  However patient adamantly refusing transfer, will continue therapy    11/10 orthopedic consult per patient request, states that she follows up closely with IVAN  Patient seen by OT, requiring max assistance  Still adamantly refusing skilled nursing referral  Continue inpatient therapy  Assisted living facility will need to meet patient's needs prior to discharge  hospitalist RN to follow-up regarding patient concerns    11/11  As per ortho, R axillary shoulder xray  - nwb to RUE x 2 weeks  - outpatient f/u with surgery in 2 weeks  - continuous use of shoulder sling  snf referral    11/12 encourage activity  Rehab referral, case  manager to assist  Patient now agreeable    11/13-14 medically cleared for discharge to skilled nursing or inpatient rehab.  Would benefit from physical therapy.    Leukocytosis- (present on admission)  Assessment & Plan  No complaints, monitor  F/u am labs    11/10 resolving  No signs of infection    Hypertension- (present on admission)  Assessment & Plan  Continue enalapril with parameters  Monitor vitals       VTE prophylaxis: Lovenox

## 2020-11-15 NOTE — PROGRESS NOTES
[facility] Medicine Daily Progress Note    Date of Service  11/15/2020    Chief Complaint  75 y.o. female admitted 11/7/2020 with shoulder dislocation.    Hospital Course  This is a 75 y.o. female history of hypertension, bladder incontinence who presented 11/7/2020 with a fall while bending over to try and clean up a spilled soda and ended up falling onto her right shoulder.  She did not lose consciousness.  Subsequently she had pain and is found to have an anterior dislocated shoulder.  Patient was given sedation in the emergency room and had reduction back in the place.  The patient normally uses a cane or walker with her right hand secondary to her shoulder being in a sling temporarily she is unable to handle her current walker or cane.  Her assisted living facility is unable to fully assist at this point in time recommended further evaluation before discharge to their facility.  Patient will be seen by PT/OT and consideration of skilled nursing if needed.  Currently the patient is quite verbally robust and pleasant.  On evaluation the patient notes that she had had some scabbing and bruising on her knees she states she had slid down a ramp at her house recently.  She states she otherwise has not been falling before that.    Interval Problem Update  11/9 Patient reports pain controlled with immobilizer in place  Adamantly refusing skilled nursing facility placement    Discussed with orthopedics, recommendation for weightbearing as tolerated with shoulder immobilizer in place, but will need outpatient follow-up with orthopedic surgery and 2 weeks for further recommendations and range of motion exercises    Seen by PT, recommending skilled placement, however patient refusing  We will continue therapy, OT to follow-up  We will need to demonstrate appropriate activity tolerance prior to discharge home with home health in assisted living    11/10 sitting up in chair, states that she was able to get into her chair, with  standby assist  Shoulder immobilizer in place  Recommendations to continue immobilizer use per orthopedic recommendations to allow for capsule healing, and outpatient follow-up  Patient reports prior history of shoulder dislocation x3, follows up closely with [facility] and would like orthopedic consult  Consult placed  Therapy recommending skilled nursing referral, due to max assistance requirements  Patient still adamantly refusing skilled nursing care, stating inadequate quick care at these facilities  At this point we will continue therapy inpatient, will need to follow-up regarding assisted living assistance, and ability to meet patient's needs  Patient agitated and tangential on exam, stating her care in [place] does not meet her needs, as compared to the care she per was provided in [place]  Plan of care reviewed in detail with this patient multiple times, hospitalist RN to follow-up    11/11 pleasant today, still states that [place] [facility] centers do not know what they are doing compared to [place].  Denies pain, aware of NWB recommendations and sling use  Cont therapy    11/12 patient now agreeable to discharge to rehab  Denies pain    11/13 resting comfortably watching TV, no acute complaints.  Desires discharge and physical therapy soon as possible.    11/14 no acute complaints.  Patient is looking forward to discharging and having regular physical therapy.    11/15 no complaints. Comfortably watching TV in her chair.    Consultants/Specialty  None    Code Status  Full Code    Disposition  Lives at assisted living facility  snf referral, dtr is POA  Rehab referral,  to assist  Continue therapy      Review of Systems  Review of Systems   Constitutional: Negative for fever and malaise/fatigue.   HENT: Negative for congestion.    Respiratory: Negative for cough and shortness of breath.    Cardiovascular: Negative for palpitations and leg swelling.   Gastrointestinal: Negative for abdominal pain and heartburn.    Genitourinary: Negative for dysuria and flank pain.   Musculoskeletal: Negative for back pain, joint pain and myalgias.   Neurological: Positive for weakness. Negative for dizziness, focal weakness and headaches.   Psychiatric/Behavioral: Negative for depression and memory loss. The patient is not nervous/anxious.    All other systems reviewed and are negative.       Physical Exam  Temp:  [36.5 °C (97.7 °F)-36.8 °C (98.3 °F)] 36.8 °C (98.3 °F)  Pulse:  [] 87  Resp:  [16-18] 16  BP: ()/(45-71) 118/71  SpO2:  [94 %-98 %] 94 %    Physical Exam  Vitals signs and nursing note reviewed.   Constitutional:       General: She is not in acute distress.     Appearance: She is not ill-appearing or toxic-appearing.   HENT:      Head: Normocephalic and atraumatic.      Nose: Nose normal.   Eyes:      Extraocular Movements: Extraocular movements intact.      Pupils: Pupils are equal, round, and reactive to light.   Neck:      Musculoskeletal: Neck supple.      Thyroid: No thyromegaly.      Vascular: No JVD.   Cardiovascular:      Rate and Rhythm: Normal rate.      Heart sounds: No murmur.   Pulmonary:      Effort: Pulmonary effort is normal. No respiratory distress.   Abdominal:      General: There is no distension.      Palpations: There is no mass.   Musculoskeletal:         General: Swelling present. No tenderness.      Comments: Right shoulder sling in place, minimal ecchymosis and edema, nontender to palpation.  Pulses intact distally, capillary refill less than 2 seconds.   Skin:     General: Skin is dry.   Neurological:      Mental Status: She is alert and oriented to person, place, and time.      Cranial Nerves: No cranial nerve deficit.      Sensory: No sensory deficit.      Motor: Weakness present.   Psychiatric:         Thought Content: Thought content normal.         Fluids    Intake/Output Summary (Last 24 hours) at 11/15/2020 1317  Last data filed at 11/14/2020 2000  Gross per 24 hour   Intake 200 ml    Output --   Net 200 ml       Laboratory                        Imaging  DX-SHOULDER 1 VIEW RIGHT   Final Result      1.  There is no evidence of dislocation on this single axillary view of the right shoulder.      DX-SHOULDER 2+ RIGHT   Final Result         Interval reduction of the right shoulder dislocation. No definite fracture is seen.      DX-HUMERUS 2+ RIGHT   Final Result            Anterior inferior right shoulder dislocation.      DX-SHOULDER 2+ RIGHT   Final Result         Anterior inferior right shoulder dislocation.           Assessment/Plan  * Anterior dislocation of shoulder, right, initial encounter- (present on admission)  Assessment & Plan  Status post replacement in the emergency room with sedation.  Pain management  Patient normally uses cane and walker.  Currently due to her arm being in a sling she cannot use this.  The assisted living facility felt they could not take her back at this point time till she has a safe discharge.      11/8 PT/OT evaluate patient.  May need new type of walker to aid her.  Patient may require skilled nursing placement if unable to get back to her prior assisted living facility.  No pain with assistance  S/p mechanical fall    11/9 PT/OT evaluation, shoulder sling  Follow-up a.m. labs leukocytosis noted  We will follow-up with Ortho regarding weightbearing status recommendations  PT recommending skilled nursing referral, referral placed  However patient adamantly refusing transfer, will continue therapy    11/10 orthopedic consult per patient request, states that she follows up closely with [facility]  Patient seen by OT, requiring max assistance  Still adamantly refusing skilled nursing referral  Continue inpatient therapy  Assisted living facility will need to meet patient's needs prior to discharge  hospitalist RN to follow-up regarding patient concerns    11/11  As per ortho, R axillary shoulder xray  - nwb to RUE x 2 weeks  - outpatient f/u with surgery in 2 weeks  -  continuous use of shoulder sling  snf referral    11/12 encourage activity  Rehab referral,  to assist  Patient now agreeable    11/13-15 medically cleared for discharge to skilled nursing or inpatient rehab.  Would benefit from physical therapy.    Leukocytosis- (present on admission)  Assessment & Plan  No complaints, monitor  F/u am labs    11/10 resolving  No signs of infection    Hypertension- (present on admission)  Assessment & Plan  Continue enalapril with parameters  Monitor vitals       VTE prophylaxis: Lovenox

## 2020-11-16 PROBLEM — S43.014A: Status: RESOLVED | Noted: 2020-11-07 | Resolved: 2020-11-16

## 2020-11-16 PROBLEM — D72.829 LEUKOCYTOSIS: Status: RESOLVED | Noted: 2020-11-08 | Resolved: 2020-11-16

## 2020-11-16 PROCEDURE — 99217 PR OBSERVATION CARE DISCHARGE: CPT | Performed by: INTERNAL MEDICINE

## 2020-11-16 PROCEDURE — G0378 HOSPITAL OBSERVATION PER HR: HCPCS

## 2020-11-16 PROCEDURE — 700111 HCHG RX REV CODE 636 W/ 250 OVERRIDE (IP): Performed by: HOSPITALIST

## 2020-11-16 PROCEDURE — 97530 THERAPEUTIC ACTIVITIES: CPT

## 2020-11-16 PROCEDURE — A9270 NON-COVERED ITEM OR SERVICE: HCPCS | Performed by: HOSPITALIST

## 2020-11-16 PROCEDURE — 700102 HCHG RX REV CODE 250 W/ 637 OVERRIDE(OP): Performed by: HOSPITALIST

## 2020-11-16 PROCEDURE — 96372 THER/PROPH/DIAG INJ SC/IM: CPT

## 2020-11-16 RX ORDER — ACETAMINOPHEN 325 MG/1
650 TABLET ORAL EVERY 6 HOURS PRN
Qty: 30 TAB | Refills: 0 | COMMUNITY
Start: 2020-11-16

## 2020-11-16 RX ORDER — DIPHENHYDRAMINE HYDROCHLORIDE, ZINC ACETATE 2; .1 G/100G; G/100G
1 CREAM TOPICAL 3 TIMES DAILY PRN
Status: SHIPPED
Start: 2020-11-16

## 2020-11-16 RX ADMIN — DOCUSATE SODIUM 50 MG AND SENNOSIDES 8.6 MG 2 TABLET: 8.6; 5 TABLET, FILM COATED ORAL at 18:41

## 2020-11-16 RX ADMIN — Medication: at 22:18

## 2020-11-16 RX ADMIN — BUPROPION HYDROCHLORIDE 100 MG: 100 TABLET, FILM COATED, EXTENDED RELEASE ORAL at 04:56

## 2020-11-16 RX ADMIN — DULOXETINE HYDROCHLORIDE 60 MG: 30 CAPSULE, DELAYED RELEASE ORAL at 04:58

## 2020-11-16 RX ADMIN — ENALAPRIL MALEATE 20 MG: 10 TABLET ORAL at 04:56

## 2020-11-16 RX ADMIN — ENOXAPARIN SODIUM 40 MG: 40 INJECTION SUBCUTANEOUS at 04:56

## 2020-11-16 ASSESSMENT — COGNITIVE AND FUNCTIONAL STATUS - GENERAL
PERSONAL GROOMING: A LITTLE
WALKING IN HOSPITAL ROOM: A LITTLE
SUGGESTED CMS G CODE MODIFIER MOBILITY: CK
HELP NEEDED FOR BATHING: A LOT
DAILY ACTIVITIY SCORE: 14
EATING MEALS: A LITTLE
MOBILITY SCORE: 17
DRESSING REGULAR LOWER BODY CLOTHING: A LOT
STANDING UP FROM CHAIR USING ARMS: A LITTLE
SUGGESTED CMS G CODE MODIFIER DAILY ACTIVITY: CK
DRESSING REGULAR UPPER BODY CLOTHING: A LOT
TOILETING: A LOT
CLIMB 3 TO 5 STEPS WITH RAILING: A LOT
TURNING FROM BACK TO SIDE WHILE IN FLAT BAD: A LITTLE
MOVING TO AND FROM BED TO CHAIR: A LITTLE
MOVING FROM LYING ON BACK TO SITTING ON SIDE OF FLAT BED: A LITTLE

## 2020-11-16 ASSESSMENT — GAIT ASSESSMENTS
DEVIATION: STEP TO;DECREASED BASE OF SUPPORT
GAIT LEVEL OF ASSIST: MINIMAL ASSIST
DISTANCE (FEET): 35
ASSISTIVE DEVICE: HEMI-WALKER

## 2020-11-16 NOTE — THERAPY
"Physical Therapy   Daily Treatment     Patient Name: Hannah Singh  Age:  75 y.o., Sex:  female  Medical Record #: [id number]  Today's Date: 11/16/2020     Precautions: Fall Risk, Non Weight Bearing Right Upper Extremity, Sling Right Upper Extremity    Assessment    Today, pt is making better use of the hemiwalker with L UE. Pt tolerated ambulation x 35 feet with Magy. Pt needed mod A for sit to stand from EOB, R knee is an ongoing problem as she reports need to \"unlock\" the knee in standing. PT to continue.    Plan    Continue current treatment plan.    DC Equipment Recommendations: Unable to determine at this time  Discharge Recommendations: Recommend post-acute placement for additional physical therapy services prior to discharge home       Objective       11/16/20 0959   Cognition    Level of Consciousness Alert   Comments improved understanding of R UE restrictions.    Gait Analysis   Gait Level Of Assist Minimal Assist   Assistive Device Quang-Walker   Distance (Feet) 35   Bed Mobility    Supine to Sit Minimal Assist   Sit to Supine   (up chair)   Scooting Minimal Assist   Rolling Minimum Assist to Lt.   Functional Mobility   Sit to Stand Moderate Assist  (first sit to stand is harder, gets better with repetition)   Bed, Chair, Wheelchair Transfer Minimal Assist   Toilet Transfers Minimal Assist  (pt asks to use toilet, uses grab bar with L hand.)   Transfer Method Stand Pivot   Short Term Goals    Short Term Goal # 1 Patient will move supine<>sitting EOB without bed features with supervision within 6tx in order to get in/out of bed   Goal Outcome # 1 goal not met   Short Term Goal # 2 Patient will move sitting<>standing with supervision within 6tx in order to initiate gait and transfers   Goal Outcome # 2 Goal not met   Short Term Goal # 3 Patient will ambulate 15ft with min A within 6tx in order to access environment   Goal Outcome # 3 Goal met, new goal added   Short Term Goal # 3 B Pt will ambulate x 125 " feet using hemiwalker with supervision in 6 visits.    Goal Outcome # 3 B Goal not met   Anticipated Discharge Equipment and Recommendations   DC Equipment Recommendations Unable to determine at this time   Discharge Recommendations Recommend post-acute placement for additional physical therapy services prior to discharge home

## 2020-11-16 NOTE — DISCHARGE PLANNING
Care Transition Team Discharge Planning    Anticipated Discharge Information  Discharge Disposition: D/T to SNF with Medicare cert in anticipation of skilled care (03)  Discharge Contact Phone Number: [phone]              Discharge Plan:  [facility]    This RN CM spoke with Pt last Friday and informed her that [facility] is waiting for insurance Auth.    Per [facility] today, they need current PT/OT notes. Informed Faiza of PT .   Will continue to assist with discharge as needed,

## 2020-11-16 NOTE — DISCHARGE PLANNING
Agency/Facility Name: [facility]  Spoke To: Gisel  Outcome: Pending auth facility will call this CCA back.      @[id number]  Agency/Facility Name: [facility]  Spoke To: Gisel  Outcome: Facility asking for updated PT notes.   STEFANIA Kendrick notified that there are no notes that are more recent than the 11/12.      @1057  PT/OT notes Rightfaxed to [facility]    @1122  Agency/Facility Name: [facility]  Spoke To: Gisel  Outcome: Auth pending    @1319  Agency/Facility Name: [facility]  Spoke To: Gisel  Outcome: Auth pending CCA will call back  STEFANIA Harman notified     @[id number]  Agency/Facility Name: [facility]  Spoke To: Gisel  Outcome: Auth pending      @1509  Agency/Facility Name: [facility]  Spoke To: Gisel  Outcome: This [facility] Rightfaxed pt.'s dc summary to facility     @8671  Agency/Facility Name: [facility]  Spoke To: Gisel  Outcome: Facility needs pass-r # and SS#

## 2020-11-16 NOTE — DISCHARGE SUMMARY
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  She did not lose consciousness.  Patient was given sedation in the emergency room and closed reduction was performed.  The patient normally uses a cane or walker with her right hand secondary to her shoulder being in a sling temporarily and she has therefore been unable to handle her current walker or cane.  Her assisted living facility is unable to fully assist at this point in time and recommended further therapy before discharge to their facility.  Patient was evaluated by PT and OT, who recommended postacute placement for additional therapies.  She denies any history of frequent falls prior to the one that led to this admission.      Therefore, she is discharged in good and stable condition to skilled nursing facility.    The patient met 2-midnight criteria for an inpatient stay at the time of discharge.      FOLLOW UP ITEMS POST DISCHARGE  Physical therapy for right shoulder    DISCHARGE DIAGNOSES  Principal Problem (Resolved):    Anterior dislocation of shoulder, right, initial encounter POA: Yes  Active Problems:    Hypertension POA: Yes  Resolved Problems:    Hyponatremia POA: Yes    Leukocytosis POA: Yes      FOLLOW UP  Phil Curtis M.D.  [address] [facility] [address]  [phone]    Schedule an appointment as soon as possible for a visit       [address] [address]  [phone]          MEDICATIONS ON DISCHARGE     Medication List      START taking these medications       Instructions   acetaminophen 325 MG Tabs  Commonly known as: Tylenol   Take 2 Tabs by mouth every 6 hours as needed (Mild Pain; (Pain scale 1-3); Temp greater than 100.5 F).  Dose: 650 mg     diphenhydrAMINE-zinc acetate cream  Commonly known as: BENADRYL ITCH   Apply 1 g topically 3 times a day as needed (Itching/rash). Apply to skin rash  Dose: 1 g        CONTINUE taking these medications      Instructions   buPROPion  MG Tb12  Commonly known as: WELLBUTRIN-SR   Take 100 mg by mouth every day.  Dose: 100 mg     Centrum Silver Tabs   Take 1 Tab by mouth every day. Takes in morning or with lunch.  Dose: 1 Tab     DULoxetine 60 MG Cpep delayed-release capsule  Commonly known as: CYMBALTA   Take 60 mg by mouth every day.  Dose: 60 mg     enalapril 20 MG tablet  Commonly known as: VASOTEC   Take 20 mg by mouth every morning.  Dose: 20 mg     FLUoxetine 20 MG Caps  Commonly known as: PROZAC   Take 20 mg by mouth every morning.  Dose: 20 mg     oxybutynin 5 MG Tabs  Commonly known as: DITROPAN   Take 5 mg by mouth at bedtime as needed (bladder). Indications: Frequent Urination  Dose: 5 mg            Allergies  No Known Allergies    DIET  Orders Placed This Encounter   Procedures   • Diet Order Diet: Regular     Standing Status:   Standing     Number of Occurrences:   1     Order Specific Question:   Diet:     Answer:   Regular [1]       ACTIVITY  As tolerated and directed by skilled nursing.  Weight bearing as tolerated    LINES, DRAINS, AND WOUNDS  This is an automated list. Peripheral IVs will be removed prior to discharge.  Peripheral IV 11/07/20 20 G Anterior;Left Forearm (Active)   Site Assessment Clean;Dry;Intact 11/16/20 1351   Dressing Type Transparent 11/16/20 1351   Line Status Saline locked 11/16/20 1351   Dressing Status Clean;Dry;Intact 11/16/20 1351   Dressing Intervention Dressing changed per protocol 11/15/20 2200   Dressing Change Due 11/22/20 11/16/20 1351   Infiltration Grading ([facility] 0  11/16/20 1351   Phlebitis Scale ([facility] Only) 0 11/16/20 1351          Peripheral IV 11/07/20 20 G Anterior;Left Forearm (Active)   Site Assessment Clean;Dry;Intact 11/16/20 1351   Dressing Type Transparent 11/16/20 1351   Line Status Saline locked 11/16/20 1351   Dressing Status Clean;Dry;Intact 11/16/20 1351   Dressing Intervention Dressing changed per protocol 11/15/20 2200   Dressing Change Due 11/22/20 11/16/20 1351   Infiltration Grading ([facility]) 0 11/16/20 1351   Phlebitis Scale ([facility] Only) 0 11/16/20 1351               MENTAL STATUS ON TRANSFER  Level of Consciousness: Alert  Orientation : Oriented x 4  Speech: Speech Clear    CONSULTATIONS  Orthopedic surgery    PROCEDURES  11/7/20 closed reduction of right shoulder dislocation under conscious sedation in the ED    LABORATORY  Lab Results   Component Value Date    SODIUM 135 11/10/2020    POTASSIUM 4.0 11/10/2020    CHLORIDE 99 11/10/2020    CO2 27 11/10/2020    GLUCOSE 119 (H) 11/10/2020    BUN 17 11/10/2020    CREATININE 0.79 11/10/2020        Lab Results   Component Value Date    WBC 11.8 (H) 11/10/2020    HEMOGLOBIN 12.8 11/10/2020    HEMATOCRIT 38.9 11/10/2020    PLATELETCT 362 11/10/2020        Total time of the discharge process exceeds 35 minutes.

## 2020-11-16 NOTE — THERAPY
Occupational Therapy  Daily Treatment     Patient Name: Hannah Singh  Age:  75 y.o., Sex:  female  Medical Record #: [id number]  Today's Date: 11/16/2020     Precautions  Precautions: (P) Fall Risk, Weight Bearing As Tolerated Right Upper Extremity, Sling Right Upper Extremity(sling on at all times)  Comments: no ROM R UE, 11/10: sling for 2 weeks,     Assessment    Pt currently limited by decreased functional mobility, activity tolerance, balance, and pain which are affecting pt's ability to complete ADLs/IADLs at baseline. Pt would benefit from OT services in the acute care setting to maximize functional recovery.     Plan    Continue current treatment plan.    DC Equipment Recommendations: Unable to determine at this time  Discharge Recommendations: (P) Recommend post-acute placement for additional occupational therapy services prior to discharge home       11/16/20 1027   Activities of Daily Living   Grooming Minimal Assist   Upper Body Dressing Moderate Assist   Lower Body Dressing Moderate Assist   Functional Mobility   Sit to Stand Moderate Assist   Bed, Chair, Wheelchair Transfer Minimal Assist   Toilet Transfers Minimal Assist   Short Term Goals   Short Term Goal # 1 pt will dress UB with supv   Goal Outcome # 1 Progressing as expected   Short Term Goal # 2 pt will dress LB with supv and AE prn   Goal Outcome # 2 Progressing as expected   Short Term Goal # 3 pt will demo toilet txf with supv   Goal Outcome # 3 Progressing as expected   Short Term Goal # 4 pt will demo toileting w/ supv   Goal Outcome # 4 Progressing as expected

## 2020-11-16 NOTE — PROGRESS NOTES
Bedside report received.  Assessment complete.  A&O x 4. Patient calls appropriately.  Patient ambulates with x1 assist.   Patient has 0/10 pain.   Denies N&V. Tolerating regular diet.  R shoulder sling in place.  + void, + flatus, + BM.  Patient denies SOB.  Review plan with of care with patient. Call light and personal belongings with in reach. Hourly rounding in place. All needs met at this time.

## 2020-11-16 NOTE — HOSPITAL COURSE
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  She did not lose consciousness.  Patient was given sedation in the emergency room and closed reduction was performed.  The patient normally uses a cane or walker with her right hand secondary to her shoulder being in a sling temporarily and she has therefore been unable to handle her current walker or cane.  Her assisted living facility is unable to fully assist at this point in time and recommended further therapy before discharge to their facility.  Patient was evaluated by PT and OT, who recommended postacute placement for additional therapies.  She denies any history of frequent falls prior to the one that led to this admission.

## 2020-11-17 VITALS
HEIGHT: 61 IN | SYSTOLIC BLOOD PRESSURE: 97 MMHG | RESPIRATION RATE: 20 BRPM | BODY MASS INDEX: 30.87 KG/M2 | WEIGHT: 163.5 LBS | OXYGEN SATURATION: 95 % | HEART RATE: 49 BPM | TEMPERATURE: 97.2 F | DIASTOLIC BLOOD PRESSURE: 49 MMHG

## 2020-11-17 PROCEDURE — G0378 HOSPITAL OBSERVATION PER HR: HCPCS

## 2020-11-17 PROCEDURE — 96372 THER/PROPH/DIAG INJ SC/IM: CPT

## 2020-11-17 PROCEDURE — 700102 HCHG RX REV CODE 250 W/ 637 OVERRIDE(OP): Performed by: HOSPITALIST

## 2020-11-17 PROCEDURE — 700111 HCHG RX REV CODE 636 W/ 250 OVERRIDE (IP): Performed by: HOSPITALIST

## 2020-11-17 PROCEDURE — A9270 NON-COVERED ITEM OR SERVICE: HCPCS | Performed by: HOSPITALIST

## 2020-11-17 RX ADMIN — ENOXAPARIN SODIUM 40 MG: 40 INJECTION SUBCUTANEOUS at 04:28

## 2020-11-17 RX ADMIN — BUPROPION HYDROCHLORIDE 100 MG: 100 TABLET, FILM COATED, EXTENDED RELEASE ORAL at 04:29

## 2020-11-17 RX ADMIN — ENALAPRIL MALEATE 20 MG: 10 TABLET ORAL at 04:29

## 2020-11-17 RX ADMIN — DULOXETINE HYDROCHLORIDE 60 MG: 30 CAPSULE, DELAYED RELEASE ORAL at 04:29

## 2020-11-17 NOTE — DISCHARGE PLANNING
Care Transition Team Discharge Planning    Anticipated Discharge Information  Discharge Disposition: D/T to SNF with Medicare cert in anticipation of skilled care (03)  Discharge Contact Phone Number: [phone]              Discharge Plan:  [facility]    This RN JAMES spoke with Leonila Mayes, Pt' daughter and informed her that Pt is discharging to [facility].    Pt is discharging via [facility] at 12:00 pm today. Informed YOHAN Saez. Colt texted Dr Dickey and requested actual script for narcotic if Pt needs it.  [facility].transfer packet prepared.

## 2020-11-17 NOTE — DISCHARGE PLANNING
Anticipated Discharge Disposition: [facility]    Action:   · Completed chart review and discussed pt's POC in IDT rounds  · Per MD, pt is medically cleared for discharge to SNF  · Per ABELARDO Blanco, pt is pending insurance auth     Barriers to Discharge: insurance auth    Plan: MC to f/u with [facility] tomorrow regarding insurance auth for [facility] and set up transportation.       Addendum:    1500: RN CM completed [facility] and transportation form in anticipation on morning discharge 11/17/2020.

## 2020-11-17 NOTE — PROGRESS NOTES
Pt AO x 4  Vitals signs stable  Pt denies chest pain or SOB  O2 sat >90% on RA breathing unlabored   Pt denies pain.   Pt denies N/V/D  + voiding, incontinent of urine at night, + flatus, + Bowel sounds  Pt ambulates with1x assist   R arm in sling.   SCDs refused.      Updated plan of care discussed with pt. Safety education done. Falls precautions in place.   Pt safety maintained. Hourly rounding done.

## 2020-11-17 NOTE — PROGRESS NOTES
Report called to RN at [facility]    Pt discharged with Mastodon C. All belongings gathered and sent with pt, including four wheel walker. x2 copies of d/c instructions in [facility]. Went over d/c education with pt and answered questions, signed copy in chart. PIV removed.

## 2020-11-17 NOTE — DISCHARGE INSTRUCTIONS
Discharge Instructions    Discharged to other by medical transportation with escort. Discharged via wheelchair, hospital escort: Yes.  Special equipment needed: Not Applicable    Be sure to schedule a follow-up appointment with your primary care doctor or any specialists as instructed.     Discharge Plan:   Diet Plan: Discussed  Activity Level: Discussed  Confirmed Follow up Appointment: Appointment Scheduled  Confirmed Symptoms Management: Discussed  Medication Reconciliation Updated: Yes  Influenza Vaccine Indication: Not indicated: Previously immunized this influenza season and > 8 years of age    I understand that a diet low in cholesterol, fat, and sodium is recommended for good health. Unless I have been given specific instructions below for another diet, I accept this instruction as my diet prescription.   Other diet: regular    Special Instructions: None    · Is patient discharged on Warfarin / Coumadin?   No     Depression / Suicide Risk    As [facility] are discharged [facility], it is important to learn how to keep safe from harming yourself.    Recognize the warning signs:  · Abrupt changes in personality, positive or negative- including increase in energy   · Giving away possessions  · Change in eating patterns- significant weight changes-  positive or negative  · Change in sleeping patterns- unable to sleep or sleeping all the time   · Unwillingness or inability to communicate  · Depression  · Unusual sadness, discouragement and loneliness  · Talk of wanting to die  · Neglect of personal appearance   · Rebelliousness- reckless behavior  · Withdrawal from people/activities they love  · Confusion- inability to concentrate     If you or a loved one observes any of these behaviors or has concerns about self-harm, here's what you can do:  · Talk about it- your feelings and reasons for harming yourself  · Remove any means that you might use to hurt yourself (examples: pills, rope, extension  cords, firearm)  · Get professional help from the community (Mental Health, Substance Abuse, psychological counseling)  · Do not be alone:Call your Safe Contact- someone whom you trust who will be there for you.  · Call your local CRISIS HOTLINE 060-3076 or 190-827-7822  · Call your local Children's Mobile Crisis Response Team [place] (469) 784-6291 or www.[place]  · Call the toll free National Suicide Prevention Hotlines   · National Suicide Prevention Lifeline 153-410-VZVL (2259)  · National Hope Line Network 800-SUICIDE (925-1720)

## 2020-11-17 NOTE — PROGRESS NOTES
A&O x 4. Patient calls appropriately.  Patient ambulates with SBA assist. Bed alarm on.   Patient has 0/10 pain.   Denies N&V. Tolerating regular diet.  R arm in sling.  + void, + flatus, 11/16 BM.  Patient denies SOB.  Patient to be transferred to [facility] today at 1200.  Review plan with of care with patient. Call light and personal belongings with in reach. Hourly rounding in place. All needs met at this time.

## 2020-11-17 NOTE — DISCHARGE PLANNING
Agency/Facility Name: [facility]  Spoke To: Gisel  Outcome: Pt. Accepted and bed available anytime today. STEFANIA Kendrick notified that this [facility] needs transport form.      Received Transport Form @ 0818  Spoke to Travon @ [facility]    Transport is scheduled for 11/17 @1200 going to [facility].  STEFANIA Kendrick notified  DAVID Saez notified @0887      @0896  Agency/Facility Name: [facility]  Spoke To: Gisel  Outcome: Facility notified of transport

## 2021-01-14 DIAGNOSIS — Z23 NEED FOR VACCINATION: ICD-10-CM

## 2021-10-15 ENCOUNTER — TELEPHONE (OUTPATIENT)
Dept: INTERNAL MEDICINE | Facility: OTHER | Age: 76
End: 2021-10-15

## 2021-10-15 DIAGNOSIS — F03.91 DEMENTIA WITH BEHAVIORAL DISTURBANCE, UNSPECIFIED DEMENTIA TYPE: ICD-10-CM

## 2021-10-15 NOTE — TELEPHONE ENCOUNTER
Kenny LUCIO Stating [facility] is [facility] and that they are out of network for [facility]'s insurance,   Please Re-send Referral Accordingly in Epic as it was previously sent in [facility]